# Patient Record
Sex: MALE | Race: WHITE
[De-identification: names, ages, dates, MRNs, and addresses within clinical notes are randomized per-mention and may not be internally consistent; named-entity substitution may affect disease eponyms.]

---

## 2017-01-09 ENCOUNTER — HOSPITAL ENCOUNTER (INPATIENT)
Dept: HOSPITAL 45 - C.EDB | Age: 82
LOS: 3 days | Discharge: OTHER INSTITUTION | DRG: 247 | End: 2017-01-12
Attending: INTERNAL MEDICINE | Admitting: HOSPITALIST
Payer: COMMERCIAL

## 2017-01-09 VITALS
DIASTOLIC BLOOD PRESSURE: 90 MMHG | HEART RATE: 62 BPM | TEMPERATURE: 97.88 F | OXYGEN SATURATION: 95 % | SYSTOLIC BLOOD PRESSURE: 160 MMHG

## 2017-01-09 VITALS
DIASTOLIC BLOOD PRESSURE: 88 MMHG | TEMPERATURE: 97.7 F | OXYGEN SATURATION: 94 % | HEART RATE: 65 BPM | SYSTOLIC BLOOD PRESSURE: 164 MMHG

## 2017-01-09 VITALS
SYSTOLIC BLOOD PRESSURE: 164 MMHG | HEART RATE: 76 BPM | DIASTOLIC BLOOD PRESSURE: 85 MMHG | TEMPERATURE: 97.16 F | OXYGEN SATURATION: 96 %

## 2017-01-09 VITALS
OXYGEN SATURATION: 95 % | DIASTOLIC BLOOD PRESSURE: 83 MMHG | SYSTOLIC BLOOD PRESSURE: 150 MMHG | HEART RATE: 64 BPM | TEMPERATURE: 97.34 F

## 2017-01-09 VITALS
HEIGHT: 71 IN | HEIGHT: 71 IN | WEIGHT: 221.34 LBS | BODY MASS INDEX: 30.99 KG/M2 | BODY MASS INDEX: 30.99 KG/M2 | WEIGHT: 221.34 LBS

## 2017-01-09 VITALS
SYSTOLIC BLOOD PRESSURE: 164 MMHG | OXYGEN SATURATION: 94 % | DIASTOLIC BLOOD PRESSURE: 85 MMHG | TEMPERATURE: 97.16 F | HEART RATE: 76 BPM

## 2017-01-09 DIAGNOSIS — Z79.82: ICD-10-CM

## 2017-01-09 DIAGNOSIS — Z90.49: ICD-10-CM

## 2017-01-09 DIAGNOSIS — Z79.899: ICD-10-CM

## 2017-01-09 DIAGNOSIS — Z87.891: ICD-10-CM

## 2017-01-09 DIAGNOSIS — Z96.1: ICD-10-CM

## 2017-01-09 DIAGNOSIS — Z86.73: ICD-10-CM

## 2017-01-09 DIAGNOSIS — Z66: ICD-10-CM

## 2017-01-09 DIAGNOSIS — N18.3: ICD-10-CM

## 2017-01-09 DIAGNOSIS — N28.9: ICD-10-CM

## 2017-01-09 DIAGNOSIS — I25.110: Primary | ICD-10-CM

## 2017-01-09 DIAGNOSIS — E78.5: ICD-10-CM

## 2017-01-09 DIAGNOSIS — Z88.8: ICD-10-CM

## 2017-01-09 DIAGNOSIS — Z98.41: ICD-10-CM

## 2017-01-09 DIAGNOSIS — Z95.5: ICD-10-CM

## 2017-01-09 DIAGNOSIS — Z80.9: ICD-10-CM

## 2017-01-09 DIAGNOSIS — I12.9: ICD-10-CM

## 2017-01-09 LAB
ALP SERPL-CCNC: 67 U/L (ref 45–117)
ALT SERPL-CCNC: 33 U/L (ref 12–78)
ANION GAP SERPL CALC-SCNC: 13 MMOL/L (ref 3–11)
AST SERPL-CCNC: 31 U/L (ref 15–37)
BASOPHILS # BLD: 0.04 K/UL (ref 0–0.2)
BASOPHILS NFR BLD: 0.5 %
BUN SERPL-MCNC: 26 MG/DL (ref 7–18)
BUN/CREAT SERPL: 13 (ref 10–20)
CALCIUM SERPL-MCNC: 9.4 MG/DL (ref 8.5–10.1)
CHLORIDE SERPL-SCNC: 106 MMOL/L (ref 98–107)
CKMB/CK RATIO: 2 (ref 0–3)
CO2 SERPL-SCNC: 24 MMOL/L (ref 21–32)
COMPLETE: YES
CREAT CL PREDICTED SERPL C-G-VRATE: 32.3 ML/MIN
CREAT SERPL-MCNC: 2 MG/DL (ref 0.6–1.4)
EOSINOPHIL NFR BLD AUTO: 169 K/UL (ref 130–400)
GLUCOSE SERPL-MCNC: 113 MG/DL (ref 70–99)
HCT VFR BLD CALC: 52.1 % (ref 42–52)
IG%: 0.3 %
IMM GRANULOCYTES NFR BLD AUTO: 27 %
INR PPP: 1.1 (ref 0.9–1.1)
LYMPHOCYTES # BLD: 1.97 K/UL (ref 1.2–3.4)
MCH RBC QN AUTO: 33.2 PG (ref 25–34)
MCHC RBC AUTO-ENTMCNC: 36.3 G/DL (ref 32–36)
MCV RBC AUTO: 91.4 FL (ref 80–100)
MONOCYTES NFR BLD: 10.4 %
NEUTROPHILS # BLD AUTO: 2.6 %
NEUTROPHILS NFR BLD AUTO: 59.2 %
PMV BLD AUTO: 10 FL (ref 7.4–10.4)
POTASSIUM SERPL-SCNC: 3.5 MMOL/L (ref 3.5–5.1)
PROTHROMBIN TIME: 11.8 SECONDS (ref 9–12)
RBC # BLD AUTO: 5.7 M/UL (ref 4.7–6.1)
SODIUM SERPL-SCNC: 143 MMOL/L (ref 136–145)
WBC # BLD AUTO: 7.3 K/UL (ref 4.8–10.8)

## 2017-01-09 RX ADMIN — METOPROLOL TARTRATE SCH MG: 25 TABLET, FILM COATED ORAL at 21:14

## 2017-01-09 RX ADMIN — HEPARIN SODIUM SCH UNIT: 10000 INJECTION, SOLUTION INTRAVENOUS; SUBCUTANEOUS at 21:15

## 2017-01-09 RX ADMIN — ALUMINUM ZIRCONIUM TRICHLOROHYDREX GLY SCH EA: 0.2 STICK TOPICAL at 16:00

## 2017-01-09 NOTE — DIAGNOSTIC IMAGING REPORT
CHEST ONE VIEW PORTABLE



CLINICAL HISTORY: Chest pain.    



COMPARISON STUDY:  Chest radiograph April 15, 2015.



FINDINGS: Lung volumes are normal. There is no pneumothorax or pleural effusion.

Tortuosity of the descending thoracic aorta is unchanged. Mild cardiomegaly is

unchanged. There is no evidence of pulmonary edema. 



IMPRESSION:  No acute findings. No change in appearance of the chest. 









Electronically signed by:  Brad Magdaleno M.D.

1/9/2017 8:26 AM



Dictated Date/Time:  1/9/2017 8:24 AM

## 2017-01-09 NOTE — HISTORY AND PHYSICAL
History & Physical


Date & Time of Service:


Jan 9, 2017 at 09:48


Chief Complaint:


Chest Pain


Primary Care Physician:


Driss Espana D.O.


History of Present Illness


Source:  patient


This is an 87 y/o male with PMHx of CKD stage 3, CAD s/p stent placement, HTN, 

Dyslipidemia and other problems as outlined below who presents to the ED c/o 

intermittent chest pain x 3 days. Pt reports that 3 days ago he had some 

indigestion after eating pizza and developed 6/10 central "burning" chest pain 

that radiated to his esophagus and bilat shoulders. He tried taking a Prilosec 

which seemed to help however over the next few days his sxs would return when 

he was exerting himself. He states he always has some SOB but it also seemed to 

be worse than usual over the last few days. Pt has a history of tobacco use and 

CAD with stent placed a few years ago. He feels that this pain is similar to 

the sxs he felt before his stent was placed. Pt denies history of reflux. He 

currently resides at [facility]. He did not take his 

medicine this morning. Pt denies fever/chills, diaphoresis, palpitations, 

wheezing, abd pain, N/V, bowel or bladder issues, LE edema ,calf pain, 

lightheadedness/dizziness. In the ED, vitals are stable. Initial trop is 

negative and EKG showing new T wave inversions in the inferior leads and 

nonspec ST changes in the lateral leads. Pt received nitro which alleviated 

some of his pain. He is currently c/o 3/10 chest pain. Pt will be admitted for 

further evaluation and treatment.





Past Medical/Surgical History


Medical Problems:


(1) CAD (coronary artery disease)


Status: Chronic  





(2) CKD (chronic kidney disease) stage 3, GFR 30-59 ml/min


Status: Chronic  





(3) CVA (cerebral vascular accident)


Status: Resolved  





(4) Dyslipidemia


Status: Chronic  





(5) HTN (hypertension)


Status: Chronic  





Surgical Problems:


(1) H/O cataract removal with insertion of prosthetic lens


Permanent Comment: R eye 


Status: Resolved  





(2) H/O umbilical hernia repair


Status: Resolved  





(3) Hx of appendectomy


Status: Resolved  





(4) Hx of cholecystectomy


Status: Resolved  











Family History





Cancer





Social History


Smoking Status:  Former Smoker (15 pack year history; quit 1965)


Alcohol Use:  none


Drug Use:  none


Marital Status:  single


Housing status:  lives alone ([facility])


Occupational Status:  retired





Immunizations


History of Influenza Vaccine:  Yes


Influenza Vaccine Date:  Sep 12, 2013


History of Tetanus Vaccine?:  Yes


History of Pneumococcal:  Yes


Pneumococcal Date:  Dec 12, 2010


History of Hepatitis B Vaccine:  No





Multi-Drug Resistant Organisms


History of MDRO:  No





Allergies


Coded Allergies:  


     Loratadine (Verified  Adverse Reaction, Intermediate, GI SYMPTOMS, 1/9/17)


     Simvastatin (Verified  Adverse Reaction, Mild, MUSCLE /JOINT PAINT PAIN, 1/ 9/17)





Home Medications


Scheduled


Acetaminophen (Tylenol), 500 MG PO PRN


Amlodipine (Norvasc), 5 MG PO DAILY


Aspirin (Aspirin Ec), 162 MG PO DAILY


Fenofibrate (Tricor), 67 MG PO DAILY


Hydrochlorothiazide (Hctz *), 25 MG PO DAILY


Losartan Potassium (Cozaar *), 50 MG PO DAILY


Metoprolol Tartrate (Lopressor), 25 MG PO BID


Pot Phosphate Monobasic W/ Sod (Phospha 250 Neutral), 250 MG PO DAILY





Review of Systems


Constitutional:  No chills, No fatigue, No fever, No sweats, No weakness


Eyes:  No worsening of vision


ENT:  No hearing loss


Respiratory:  + dyspnea on exertion, + shortness of breath, No cough, No 

dyspnea at rest


Cardiovascular:  + chest pain, No claudication, No edema


Abdomen:  No constipation, No diarrhea, No nausea, No pain, No vomiting


Musculoskeletal:  No calf pain, No swelling


Genitourinary - Male:  No dysuria


Neurologic:  No weakness


Psychiatric:  No depression symptoms


Endocrine:  No fatigue


Hematologic / Lymphatic:  No abnormal bleeding/bruising


Integumentary:  No new/changing skin lesions





Physical Exam


Vital Signs











  Date Time  Temp Pulse Resp B/P Pulse Ox O2 Delivery O2 Flow Rate FiO2


 


1/9/17 08:32  64 16 137/78 94 Room Air  


 


1/9/17 07:57  76      


 


1/9/17 07:52     96 Room Air  


 


1/9/17 07:52     96 Room Air  


 


1/9/17 07:48     96 Room Air  


 


1/9/17 07:48 36.6 78 18 162/80 96 Room Air  








General Appearance:  WD/WN, no apparent distress, + pertinent finding (Pt is 

sitting up in bed in NAD)


Head:  normocephalic, atraumatic


Eyes:  normal inspection


ENT:  hearing grossly normal


Neck:  supple


Respiratory/Chest:  chest non-tender, lungs clear, normal breath sounds, no 

respiratory distress


Cardiovascular:  regular rate, rhythm, no murmur


Abdomen/GI:  normal bowel sounds, non tender, soft


Back:  normal inspection


Extremities/Musculoskelatal:  normal inspection, no calf tenderness, + swelling 

(trace bilat LE edema)


Neurologic/Psych:  alert, normal mood/affect, oriented x 3


Skin:  normal color, warm/dry





Diagnostics


Laboratory Results





Results Past 24 Hours








Test


  1/9/17


08:05 Range/Units


 


 


White Blood Count 7.30 4.8-10.8  K/uL


 


Red Blood Count 5.70 4.7-6.1  M/uL


 


Hemoglobin 18.9 14.0-18.0  g/dL


 


Hematocrit 52.1 42-52  %


 


Mean Corpuscular Volume 91.4   fL


 


Mean Corpuscular Hemoglobin 33.2 25-34  pg


 


Mean Corpuscular Hemoglobin


Concent 36.3


  32-36  g/dl


 


 


Platelet Count 169 130-400  K/uL


 


Mean Platelet Volume 10.0 7.4-10.4  fL


 


Neutrophils (%) (Auto) 59.2  %


 


Lymphocytes (%) (Auto) 27.0  %


 


Monocytes (%) (Auto) 10.4  %


 


Eosinophils (%) (Auto) 2.6  %


 


Basophils (%) (Auto) 0.5  %


 


Neutrophils # (Auto) 4.32 1.4-6.5  K/uL


 


Lymphocytes # (Auto) 1.97 1.2-3.4  K/uL


 


Monocytes # (Auto) 0.76 0.11-0.59  K/uL


 


Eosinophils # (Auto) 0.19 0-0.5  K/uL


 


Basophils # (Auto) 0.04 0-0.2  K/uL


 


RDW Standard Deviation 46.8 36.4-46.3  fL


 


RDW Coefficient of Variation 14.0 11.5-14.5  %


 


Immature Granulocyte % (Auto) 0.3  %


 


Immature Granulocyte # (Auto) 0.02 0.00-0.02  K/uL


 


Sodium Level 143 136-145  mmol/L


 


Potassium Level 3.5 3.5-5.1  mmol/L


 


Chloride Level 106   mmol/L


 


Carbon Dioxide Level 24 21-32  mmol/L


 


Anion Gap 13.0 3-11  mmol/L


 


Blood Urea Nitrogen 26 7-18  mg/dl


 


Creatinine


  2.00


  0.60-1.40


mg/dl


 


Est Creatinine Clear Calc


Drug Dose 32.3


   ml/min


 


 


Estimated GFR (


American) 34.0


   


 


 


Estimated GFR (Non-


American 29.3


   


 


 


BUN/Creatinine Ratio 13.0 10-20  


 


Random Glucose 113 70-99  mg/dl


 


Calcium Level 9.4 8.5-10.1  mg/dl


 


Total Bilirubin 1.1 0.2-1  mg/dl


 


Direct Bilirubin 0.3 0-0.2  mg/dl


 


Aspartate Amino Transf


(AST/SGOT) 31


  15-37  U/L


 


 


Alanine Aminotransferase


(ALT/SGPT) 33


  12-78  U/L


 


 


Alkaline Phosphatase 67   U/L


 


Total Creatine Kinase 133   U/L


 


Creatine Kinase MB 2.7 0.5-3.6  ng/ml


 


Creatine Kinase MB Ratio 2.0 0-3.0  


 


Troponin I < 0.015 0-0.045  ng/ml


 


Total Protein 7.2 6.4-8.2  gm/dl


 


Albumin 3.7 3.4-5.0  gm/dl


 


Lipase 188   U/L











Diagnostic Radiology


CXR IMPRESSION:  No acute findings. No change in appearance of the chest.





EKG


EKG: NSR at 72 bpm with left axis deviation, T wave inversion in inferior leads 

and nonspec St change sin lateral leads; T wave and ST changes are new findings 

when compared to EKG from 4/15/15





Impression


Assessment and Plan


CHEST PAIN R/O ACS 


pt presents with chest pain assoc with exertional SOB and indigestion; h/o CAD s

/p stent placement  


-admit observation status to telemetry 


-? ischemia vs. reflux sxs


-RFs include personal h/o CAD s/p stent, HTN, Dyslipidemia, sex and age 


-EKG shows new T wave inversions in inferior leads and nonspec ST changes in 

lateral leads; repeat EKG PRN chest pain and in AM


-Initial troponin is negative; continue to monitor with serial cardiac enzymes 

q6h


-obtain echo to r/o cardiac wall motion abnormalities 


-cont ASA, BB and fenofibrate 


-start trial PPI 


-consult cardiology, Dr. Meracdo-pending input  


-pt currently c/o 3/10 chest pain 


-continue to monitor 





CKD STAGE 3 


-creatinine is currently around baseline at 2.0 


-cont to monitor with daily prp and avoid nephrotoxic agents when able 





H/O CVA 


-cont ASA and fenofibrate 


-no focal neuro deficits on exam 





HTN 


-BP elevated; pt did not take medicine this morning 


-cont losartan, metoprolol, HCTZ, Norvasc 


-monitor 





DYSLIPIDEMIA 


-documented intolerance to statin 


-cont fenofibrate 





DVT PROPHYLAXIS


-subq heparin 





CODE STATUS


-DNR per my discussion with patient upon admission 





DISPO


Observation status until further workup is complete. Pt seen in collaboration 

with Dr. Elias. Please see his addendum for further details. Thanks!





Attending Note:


    


Patient is a 86 Yr old male with PMH of CAD s/p stent placement,  CKD III, HTN, 

Dyslipidemia and other problems presented with intermittent dull to burning 

sensation, retrosternal, radiating to bilateral upper extremities and throat 

since 3 days duration.States having indigestion 3 days ago which relieved 

temporarily with Prilosec.Reports associated exertional SOB, no aggravating/

relieving factors.Denies any fever, chills, diaphoresis, palpitations, cough, 

wheezing, abd pain, nausea, vomiting, dizziness. Also denies orthopnea, PND, 

leg swelling. Initial trop is negative and EKG showing new T wave inversions in 

the inferolateral leads. States getting NTG en route to hospital which relieved 

his chest pain. 





Physical Exam:


      Vital signs as noted above


      General: Well built and nourished, no distress


      HEENT: NC/AT, EOMI, PERRLA


      Neck: Supple, No JVD


      CVS; S1, S2, No murmur


      Resp: CTA, Normal breath sounds, No accessory muscle use


      Abd: Soft, Protuberant, non tender, BS present


     CNS: No focal deficits


      Ext: No pedal edema, cyanosis, clubbing








Assessment and plan:





Atypical chest pain R/O ACS


          H/O CAD S/P stents


          Negative troponin X 2, EKG:showed T wave inversions in inferior lead


          Will check old EKG to compare


          Continue monitoring in Tele


          ECHO: no wall motion abnormality


          Trend troponins


          May need stress ECHO


          Cardiology consulted


          Continue ASA, BB and fenofibrate


          H/O intolerance to statins  


          Will do trial of PPI 


       


CKD III


         Cr levels; 2.0


         Monitor renal function       





H/O CVA and Left carotid endarterectomy


         Stable


         Continue home meds





Agree with assessment and plan as above





Advanced Directives


Existing Advance Directive:  Yes


Existing Living Will:  Yes


Existing Power of :  Yes





VTE Prophylaxis


VTE Risk Assessment Done? Y/N:  Yes


Risk Level:  Moderate

## 2017-01-09 NOTE — CARDIOLOGY CONSULTATION
DATE OF CONSULTATION:  01/09/2017

 

REFERRING PHYSICIAN:  [facility].

 

REASON FOR CONSULTATION:  Chest discomfort.

 

HISTORY OF PRESENT ILLNESS:  This patient is an 86-year-old male who follows

with Dr. Yaya Slade at the [facility].  In April 2015, he

presented with a positive stress echocardiogram and underwent cardiac

catheterization.  He was found to have a severe stenosis of the right

coronary artery and received a drug-eluting stent.  His heart disease has

been stable.  The patient states that starting approximately 3 days ago he

had some indigestion and then he has noticed some upper chest discomfort and

burning intermittently with activity.  He decided to present to the Emergency

Department.  He has been given sublingual nitroglycerin as well as a GI

cocktail and nitro paste and is currently pain free.  His first set of

cardiac markers is negative.  His EKG reveals nonspecific changes.

 

ALLERGIES:  LORATADINE AND SIMVASTATIN.

 

PAST MEDICAL HISTORY:  As outlined above.  The patient had a drug-eluting

stent placed in the right coronary artery in April 2015.  He does have

dyslipidemia; however, he is intolerant of statin medications.  History of

hypertension.  He has chronic stage III kidney disease.  He has had a

previous left carotid endarterectomy following a CVA.  Although he denies

having history of GERD, it is listed as one of his complaints as an

outpatient.

 

SOCIAL HISTORY:  He is a reformed cigarette smoker.

 

FAMILY MEDICAL HISTORY:  Noncontributory.

 

REVIEW OF SYSTEMS:  A 10-point review of systems is negative except for the

history of chief complaint.

 

PHYSICAL EXAMINATION:

GENERAL:  He is alert and oriented, in no acute distress.

VITAL SIGNS:  Blood pressure is 140/80, pulse is regular at 73.  He is

afebrile.

HEENT:  Normocephalic.  Pupils are equal and reactive to light.  Extraocular

muscles are intact bilaterally.

NECK:  The neck veins are flat.  Carotids have good upstrokes bilaterally. 

Thyroid is nonpalpable.

RESPIRATORY:  Breath sounds equal bilaterally and clear to auscultation.

CARDIOVASCULAR:  Heart has regular rhythm.  Normal S1, S2.  No S3, S4.  No

cardiac rubs or murmurs.

GASTROINTESTINAL:  Abdomen is soft, nontender without organomegaly.

EXTREMITIES:  Free of edema, digit clubbing, or cyanosis.

NEUROLOGIC:  Grossly intact.

SKIN:  Warm to touch.

LYMPH NODES:  Negative to palpation.

 

LABORATORY DATA:  Per the history of chief complaint.  In addition, the first

hemoglobin is 18.9, which is high.  Her creatinine is 2.0, BUN is 26.  First

set of cardiac markers are negative.

 

IMPRESSION:

1.  Chest and throat discomfort.

2.  Known coronary artery disease with prior coronary stent placed in the

right coronary artery, April 2015.

3.  History of previous stroke and left carotid endarterectomy.

4.  Intolerance to statin medications.

 

RECOMMENDATIONS:  The patient will be admitted to observation.  He will have

additional cardiac markers drawn; however, despite having chest pain for 3

days, his first set of cardiac markers is negative and his EKG shows no acute

changes.  His echocardiogram performed in the Emergency Department shows no

wall motion abnormalities.  We will wait for a second set of markers before

making further decisions.

## 2017-01-09 NOTE — ECHOCARDIOGRAM REPORT
*NOTICE TO RECEIVING PARTY AGENCY**  This information is strictly Confidential and protected under 
[place] law.  [place] law prohibits you from making any further disclosure of this 
information unless further disclosure is expressly permitted by the written consent of the person to 
whom it pertains or is authorized by law.  A general authorization for the release of medical or 
other information is not sufficient for this purpose.  Hospital accepts no responsibility if the 
information is made available to any other person, INCLUDING THE PATIENT.



Interpretation Summary

  *  Name: KOBI NESBITT  Study Date: 2017 11:22 AM  BP: 148/88 mmHg

  *  MRN: [id number]  Patient Location: C.EDB  HR: 72

  *  : 1930 ([birth year]yyyy)  Gender: Male  Height: 71 in

  *  Age: 86 yrs  Ethnicity: [place]  Weight: 225 lb

  *  Ordering Physician: Naty Elias

  *  Referring Physician: Self, Referred

  *  Performed By: Odessa Pozo RCS

  *  Accession# [id number]  Account# [id number]

  *  Reason For Study: CHEST PAIN

  *  BSA: 2.2 m2

  *  -- Conclusions --

  *  The left ventricle is normal in size.

  *  There is mild concentric left ventricular hypertrophy.

  *  The left ventricular wall motion is normal.

  *  Ejection Fraction = 55-60%.

  *  Grade I diastolic dysfunction, (abnormal relaxation pattern).

Procedure Details

  *  A complete two-dimensional transthoracic echocardiogram was performed (2D, M-mode, Doppler and 
color flow Doppler).

  *  The study was technically difficult.

  *  A contrast injection of Definity was performed to improve assessment of LV function.

  *  Contrast was injected into an intravenous site in the left arm.

  *  One vial of Definity ultrasound contrast was diluted in normal saline to a total volume of 10 
ml.  A total of '2' ml of solution was administered during imaging.

  *  Lot # [id number] of Definity utilized for procedure.

  *  Expiration date 1 .

  *  The attending nurse who injected the contrast agent was ANTONIA DURAN ED, RN.

Left Ventricle

  *  The left ventricle is normal in size.

  *  There is mild concentric left ventricular hypertrophy.

  *  Ejection Fraction = 55-60%.

  *  The left ventricular wall motion is normal.

Right Ventricle

  *  The right ventricle is normal size.

  *  The right ventricular systolic function is normal.

Atria

  *  The left atrial size is normal.

  *  Right atrial size is normal.

  *  The interatrial septum is intact with no evidence for an atrial septal defect.

Mitral Valve

  *  The mitral valve is grossly normal.

  *  Significant mitral regurgitation is absent.

Tricuspid Valve

  *  The tricuspid valve anatomy is normal.

  *  Significant tricuspid regurgitation is absent.

Aortic Valve

  *  The aortic valve is tricuspid.  The leaflet thickness if normal. There is no aortic stenosis, 
and no significant insufficiency.

  *  Aortic stenosis is absent.

  *  There is no significant aortic regurgitation.

Pulmonic Valve

  *  The pulmonic valve is not well visualized.

  *  There is no significant pulmonary regurgitation.

Great Vessels

  *  The aortic root and proximal ascending aorta are normal sized.

Pericardium/Pleural

  *  There is no pericardial effusion.

Left Ventricular Diastolic Function

  *  Grade I diastolic dysfunction, (abnormal relaxation pattern).



MMode 2D Measurements and Calculations

IVSd 1.9 cm

IVSs 1.8 cm



LVIDd 4.5 cm

LVIDs 3.4 cm

LVPWd 1.3 cm

LVPWs 1.7 cm



IVS/LVPW 1.5 

FS 23.6 %

EDV(Teich) 90.9 ml

ESV(Teich) 47.8 ml

EF(Teich) 47.4 %



EDV(cubed) 89.2 ml

ESV(cubed) 39.7 ml

EF(cubed) 55.5 %

% IVS thick -3.12 %

% LVPW thick 32.6 %





LV mass(C)d 296.6 grams

LV mass(C)dI 133.8 grams/m\S\2

LV mass(C)s 244.9 grams

LV mass(C)sI 110.4 grams/m\S\2



SV(Teich) 43.1 ml

SI(Teich) 19.4 ml/m\S\2

SV(cubed) 49.5 ml

SI(cubed) 22.3 ml/m\S\2



Ao root diam 4.1 cm

Ao root area 13.0 cm\S\2

ACS 2.5 cm

LA dimension 3.9 cm



LA/Ao 0.95 

LVOT diam 2.0 cm

LVOT area 3.2 cm\S\2





LVAd ap4 27.6 cm\S\2

LVLd ap4 8.1 cm

EDV(MOD-sp4) 78.4 ml

EDV(sp4-el) 80.3 ml

LVAs ap4 13.8 cm\S\2

LVLs ap4 6.0 cm

ESV(MOD-sp4) 26.2 ml

ESV(sp4-el) 27.1 ml

EF(MOD-sp4) 66.5 %

EF(sp4-el) 66.2 %



LVAd ap2 24.8 cm\S\2

LVLd ap2 7.8 cm

EDV(MOD-sp2) 64.6 ml

EDV(sp2-el) 67.1 ml

LVAs ap2 15.1 cm\S\2

LVLs ap2 6.2 cm

ESV(MOD-sp2) 29.4 ml

ESV(sp2-el) 31.0 ml

EF(MOD-sp2) 54.4 %

EF(sp2-el) 53.7 %



LVLd %diff -3.71 %

EDV(MOD-bp) 70.6 ml

LVLs %diff 3.7 %

ESV(MOD-bp) 28.5 ml

EF(MOD-bp) 59.6 %



SV(MOD-sp4) 52.2 ml

SI(MOD-sp4) 23.5 ml/m\S\2





SV(MOD-sp2) 35.1 ml

SI(MOD-sp2) 15.8 ml/m\S\2



SV(MOD-bp) 42.1 ml

SI(MOD-bp) 19.0 ml/m\S\2



SV(sp4-el) 53.2 ml

SI(sp4-el) 24.0 ml/m\S\2



SV(sp2-el) 36.0 ml

SI(sp2-el) 16.3 ml/m\S\2







Doppler Measurements and Calculations

MV E max luisa 61.5 cm/sec

MV A max luisa 94.5 cm/sec



MV E/A 0.65 



MV P1/2t max luisa 66.4 cm/sec

MV P1/2t 54.2 msec

MVA(P1/2t) 4.1 cm\S\2

MV dec slope 358.8 cm/sec\S\2

MV dec time 0.33 sec



Ao V2 max 98.9 cm/sec

Ao max PG 3.9 mmHg

Ao max PG (full) 0.12 mmHg

SOPHIA(V,A) 3.2 cm\S\2

SOPHIA(V,D) 3.2 cm\S\2





LV V1 max PG 3.8 mmHg



LV V1 max 97.3 cm/sec



PA V2 max 67.0 cm/sec

PA max PG 1.8 mmHg

## 2017-01-09 NOTE — EMERGENCY ROOM VISIT NOTE
History


Report prepared by oJnas:  Naila Wyman


Under the Supervision of:  Dr. Isaac Eller M.D.


First contact with patient:  07:53


Chief Complaint:  CHEST PAIN


Stated Complaint:  CHEST PAIN





History of Present Illness


The patient is an 86 year old male who presents to the Emergency Room with 

complaints of persistent chest pain that began three days ago.  He currently 

describes his pain as a dull pain.  The patient states that several years ago 

he had a stent placed by Dr. Melara, Cardiology.  He states that at that time he 

experienced dull chest pain and states that his pain today feels similar to 

that event.  The patient states that he was given nitro prior to arrival by EMS 

and states that his pain has been alleviated.  He additionally notes 

indigestion today, but denies any abdominal pain.  The patient states that he 

takes aspirin daily, but denies any other anti-coagulants.  He states that he 

is a previous smoker several years ago, but denies any history of diabetes.





   Source of History:  patient


   Onset:  three days ago


   Position:  chest


   Quality:  dull


   Timing:  other (persistent)


   Modifying Factors (Relieving):  other (nitro)


   Associated Symptoms:  No abdominal pain


Note:


Associated Symptoms: indigestion.





Review of Systems


See HPI for pertinent positives & negatives. A total of 10 systems reviewed and 

were otherwise negative.





Past Medical & Surgical


Medical Problems:


(1) CAD (coronary artery disease)


(2) CKD (chronic kidney disease) stage 3, GFR 30-59 ml/min


(3) CVA (cerebral vascular accident)


(4) Dyslipidemia


(5) HTN (hypertension)


Surgical Problems:


(1) H/O cataract removal with insertion of prosthetic lens


(2) H/O umbilical hernia repair


(3) Hx of appendectomy


(4) Hx of cholecystectomy








Family History





Cancer





Social History


Smoking Status:  Former Smoker


Alcohol Use:  none


Drug Use:  none


Marital Status:  single


Occupation Status:  retired





Current/Historical Medications


Scheduled


Acetaminophen (Tylenol), 500 MG PO PRN


Amlodipine (Norvasc), 5 MG PO DAILY


Aspirin (Aspirin Ec), 162 MG PO DAILY


Fenofibrate (Tricor), 67 MG PO DAILY


Hydrochlorothiazide (Hctz *), 25 MG PO DAILY


Losartan Potassium (Cozaar *), 50 MG PO DAILY


Metoprolol Tartrate (Lopressor), 25 MG PO BID


Pot Phosphate Monobasic W/ Sod (Phospha 250 Neutral), 250 MG PO DAILY





Allergies


Coded Allergies:  


     Loratadine (Verified  Adverse Reaction, Intermediate, GI SYMPTOMS, 1/9/17)


     Simvastatin (Verified  Adverse Reaction, Mild, MUSCLE /JOINT PAINT PAIN, 1/ 9/17)





Physical Exam


Vital Signs











  Date Time  Temp Pulse Resp B/P Pulse Ox O2 Delivery O2 Flow Rate FiO2


 


1/9/17 10:02  62      


 


1/9/17 09:30  79 18 175/98    


 


1/9/17 09:12     94 Room Air  


 


1/9/17 08:32  64 16 137/78 94 Room Air  


 


1/9/17 07:57  76      


 


1/9/17 07:52     96 Room Air  


 


1/9/17 07:52     96 Room Air  


 


1/9/17 07:48     96 Room Air  


 


1/9/17 07:48 36.6 78 18 162/80 96 Room Air  











Physical Exam


GENERAL: Patient is a healthy-appearing well-nourished


HEAD: Normocephalic atraumatic


EYES: Ocular movements intact pupils equal and react to light


OROPHARYNX mucous membranes are moist no exudates present no erythema or edema 

present


NECK: Supple no nuchal rigidity


CHEST: Good equal expansion


LUNGS: Clear and equal to auscultation


CARDIAC: Normal S1 and S2


ABDOMEN: Soft nontender no guarding


BACK: No CVA tenderness


EXTREMITIES: No pain upon palpation normal muscle strength in all groups no 

clubbing cyanosis or edema


NEURO: Patient is following commands is answering questions appropriately. 

Alert and oriented x3 Cranial Nerves 2-12 grossly intact





Medical Decision & Procedures


ER Provider


Diagnostic Interpretation:


X-ray results as stated below per interpretation by me and the radiologist: 








CHEST ONE VIEW PORTABLE





CLINICAL HISTORY: Chest pain.    





COMPARISON STUDY:  Chest radiograph April 15, 2015.





FINDINGS: Lung volumes are normal. There is no pneumothorax or pleural effusion.


Tortuosity of the descending thoracic aorta is unchanged. Mild cardiomegaly is


unchanged. There is no evidence of pulmonary edema. 





IMPRESSION:  No acute findings. No change in appearance of the chest. 





Electronically signed by:  Brad Magdaleno M.D.


1/9/2017 8:26 AM





Dictated Date/Time:  1/9/2017 8:24 AM





Laboratory Results


1/9/17 08:05








Red Blood Count 5.70, Mean Corpuscular Volume 91.4, Mean Corpuscular Hemoglobin 

33.2, Mean Corpuscular Hemoglobin Concent 36.3, Mean Platelet Volume 10.0, 

Neutrophils (%) (Auto) 59.2, Lymphocytes (%) (Auto) 27.0, Monocytes (%) (Auto) 

10.4, Eosinophils (%) (Auto) 2.6, Basophils (%) (Auto) 0.5, Neutrophils # (Auto

) 4.32, Lymphocytes # (Auto) 1.97, Monocytes # (Auto) 0.76, Eosinophils # (Auto

) 0.19, Basophils # (Auto) 0.04





1/9/17 08:05

















Test


  1/9/17


08:05


 


White Blood Count


  7.30 K/uL


(4.8-10.8)


 


Red Blood Count


  5.70 M/uL


(4.7-6.1)


 


Hemoglobin


  18.9 g/dL


(14.0-18.0)


 


Hematocrit 52.1 % (42-52) 


 


Mean Corpuscular Volume


  91.4 fL


()


 


Mean Corpuscular Hemoglobin


  33.2 pg


(25-34)


 


Mean Corpuscular Hemoglobin


Concent 36.3 g/dl


(32-36)


 


Platelet Count


  169 K/uL


(130-400)


 


Mean Platelet Volume


  10.0 fL


(7.4-10.4)


 


Neutrophils (%) (Auto) 59.2 % 


 


Lymphocytes (%) (Auto) 27.0 % 


 


Monocytes (%) (Auto) 10.4 % 


 


Eosinophils (%) (Auto) 2.6 % 


 


Basophils (%) (Auto) 0.5 % 


 


Neutrophils # (Auto)


  4.32 K/uL


(1.4-6.5)


 


Lymphocytes # (Auto)


  1.97 K/uL


(1.2-3.4)


 


Monocytes # (Auto)


  0.76 K/uL


(0.11-0.59)


 


Eosinophils # (Auto)


  0.19 K/uL


(0-0.5)


 


Basophils # (Auto)


  0.04 K/uL


(0-0.2)


 


RDW Standard Deviation


  46.8 fL


(36.4-46.3)


 


RDW Coefficient of Variation


  14.0 %


(11.5-14.5)


 


Immature Granulocyte % (Auto) 0.3 % 


 


Immature Granulocyte # (Auto)


  0.02 K/uL


(0.00-0.02)


 


Anion Gap


  13.0 mmol/L


(3-11)


 


Est Creatinine Clear Calc


Drug Dose 32.3 ml/min 


 


 


Estimated GFR (


American) 34.0 


 


 


Estimated GFR (Non-


American 29.3 


 


 


BUN/Creatinine Ratio 13.0 (10-20) 


 


Calcium Level


  9.4 mg/dl


(8.5-10.1)


 


Total Bilirubin


  1.1 mg/dl


(0.2-1)


 


Direct Bilirubin


  0.3 mg/dl


(0-0.2)


 


Aspartate Amino Transf


(AST/SGOT) 31 U/L (15-37) 


 


 


Alanine Aminotransferase


(ALT/SGPT) 33 U/L (12-78) 


 


 


Alkaline Phosphatase


  67 U/L


()


 


Total Creatine Kinase


  133 U/L


()


 


Creatine Kinase MB


  2.7 ng/ml


(0.5-3.6)


 


Creatine Kinase MB Ratio 2.0 (0-3.0) 


 


Troponin I


  < 0.015 ng/ml


(0-0.045)


 


Total Protein


  7.2 gm/dl


(6.4-8.2)


 


Albumin


  3.7 gm/dl


(3.4-5.0)


 


Lipase


  188 U/L


()








Labs reviewed by ED physician.





Medications Administered











 Medications


  (Trade)  Dose


 Ordered  Sig/Kenny


 Route  Start Time


 Stop Time Status Last Admin


Dose Admin


 


 Nitroglycerin 1


 inch  1 inch  NOW  ONCE


 EXT  1/9/17 08:30


 1/9/17 08:31 DC 1/9/17 08:29


1 INCH


 


 Sodium Chloride


  (Nss 500ml)  500 ml @ 


 999 mls/hr  Q31M STAT


 IV  1/9/17 08:47


 1/9/17 09:17 DC 1/9/17 08:54


999 MLS/HR


 


 Famotidine


  (Pepcid Tab)  20 mg  NOW  ONCE


 PO  1/9/17 09:15


 1/9/17 09:16 DC 1/9/17 09:26


20 MG


 


 Sucralfate


  (Carafate Tab)  1 gm  NOW  STAT


 PO  1/9/17 09:04


 1/9/17 09:06 DC 1/9/17 09:26


1 GM


 


 Al Hydroxide/Mg


 Hydroxide


  (Maalox Susp)  30 ml  STK-MED ONCE


 .ROUTE  1/9/17 09:23


 1/9/17 09:24 DC 1/9/17 09:26


30 ML


 


 Lidocaine HCl


  (Viscous


 Lidocaine 2% Soln)  20 ml  STK-MED ONCE


 .ROUTE  1/9/17 09:23


 1/9/17 09:24 DC 1/9/17 09:26


20 ML











ECG


Indication:  chest pain


Rate (beats per minute):  72


Rhythm:  normal sinus


Findings:  ST depression (Anterior), T-wave inversion (Inferior), no acute 

ischemic change, no ectopy


Comparison ECG Date:  4/15/15


Change:


EKG Change: When compared to EKG done on 4/15/15, ST depressions and T wave 

inversions are new.





ED Course


0754: Past medical records reviewed. The patient was evaluated in room B7. A 

complete history and physical examination was performed. I discussed the 

treatment plan with the patient and he verbalized complete understanding and 

agreement.  He is going to be evaluated for further treatment.





0830: Ordered Nitroglycerin 1 inch EXT.





0847: Ordered Sodium Chloride 500 ml @ 999 mls/hr IV.





0850: I discussed the patients case with Abby Cardenas.  He is going 

to evaluate the patient for further treatment.





Medical Decision


Differential diagnosis:


Etiologies such as cardiac ischemia, aortic dissection, pulmonary embolism, 

pneumonia, pneumothorax, musculoskeletal, infections, pericarditis, myocarditis

, esophageal rupture, gastrointestinal, as well as others were entertained.





This is an 86-year-old male who presents emergency department complaining of 

chest pain along with epigastric pain that has been ongoing for the past 3 

days.  The patient's pain was relieved by nitroglycerin by EMS.  He received 

aspirin in route.  The patient has very subtle EKG changes including T-wave 

inversions in the inferior leads as well as what appeared to be anterior lead 

depressions.  The patient was given nitro paste here in emergency department.  

He was also given a GI cocktail, Pepcid and Carafate because he kept 

complaining of belching.  I did discuss the case with the hospitalist service 

who agreed to admit the patient.  Patient was in agreement with the treatment 

plan.





Consults


Time Called:  0847


Consulting Physician:  Abby Abbasi


Returned Call:  0850


I discussed the patients case with Abby Cardenas.  He is going to 

evaluate the patient for further treatment.





Impression





 Primary Impression:  Unstable angina





Scribe Attestation


The scribe's documentation has been prepared under my direction and personally 

reviewed by me in its entirety. I confirm that the note above accurately 

reflects all work, treatment, procedures, and medical decision making performed 

by me.





Departure Information


Dispostion


Being Evaluated By Hospitalist





Referrals


Driss Espana D.O. (PCP)

## 2017-01-10 VITALS — SYSTOLIC BLOOD PRESSURE: 157 MMHG | DIASTOLIC BLOOD PRESSURE: 91 MMHG | OXYGEN SATURATION: 96 % | HEART RATE: 66 BPM

## 2017-01-10 VITALS — HEART RATE: 69 BPM | SYSTOLIC BLOOD PRESSURE: 152 MMHG | OXYGEN SATURATION: 95 % | DIASTOLIC BLOOD PRESSURE: 89 MMHG

## 2017-01-10 VITALS
DIASTOLIC BLOOD PRESSURE: 90 MMHG | SYSTOLIC BLOOD PRESSURE: 149 MMHG | HEART RATE: 67 BPM | TEMPERATURE: 97.34 F | OXYGEN SATURATION: 95 %

## 2017-01-10 VITALS
HEART RATE: 64 BPM | DIASTOLIC BLOOD PRESSURE: 82 MMHG | TEMPERATURE: 97.7 F | OXYGEN SATURATION: 95 % | SYSTOLIC BLOOD PRESSURE: 145 MMHG

## 2017-01-10 VITALS
SYSTOLIC BLOOD PRESSURE: 132 MMHG | TEMPERATURE: 97.52 F | HEART RATE: 56 BPM | DIASTOLIC BLOOD PRESSURE: 78 MMHG | OXYGEN SATURATION: 95 %

## 2017-01-10 VITALS
HEART RATE: 60 BPM | DIASTOLIC BLOOD PRESSURE: 82 MMHG | OXYGEN SATURATION: 98 % | SYSTOLIC BLOOD PRESSURE: 163 MMHG | TEMPERATURE: 97.52 F

## 2017-01-10 VITALS — OXYGEN SATURATION: 95 %

## 2017-01-10 LAB
ANION GAP SERPL CALC-SCNC: 9 MMOL/L (ref 3–11)
BUN SERPL-MCNC: 32 MG/DL (ref 7–18)
BUN/CREAT SERPL: 15.9 (ref 10–20)
CALCIUM SERPL-MCNC: 9.2 MG/DL (ref 8.5–10.1)
CHLORIDE SERPL-SCNC: 102 MMOL/L (ref 98–107)
CO2 SERPL-SCNC: 28 MMOL/L (ref 21–32)
CREAT CL PREDICTED SERPL C-G-VRATE: 32.2 ML/MIN
CREAT SERPL-MCNC: 2 MG/DL (ref 0.6–1.4)
EOSINOPHIL NFR BLD AUTO: 163 K/UL (ref 130–400)
GLUCOSE SERPL-MCNC: 82 MG/DL (ref 70–99)
HCT VFR BLD CALC: 50.6 % (ref 42–52)
MCH RBC QN AUTO: 32.7 PG (ref 25–34)
MCHC RBC AUTO-ENTMCNC: 35.8 G/DL (ref 32–36)
MCV RBC AUTO: 91.3 FL (ref 80–100)
PMV BLD AUTO: 9.8 FL (ref 7.4–10.4)
POTASSIUM SERPL-SCNC: 3.6 MMOL/L (ref 3.5–5.1)
RBC # BLD AUTO: 5.54 M/UL (ref 4.7–6.1)
SODIUM SERPL-SCNC: 139 MMOL/L (ref 136–145)
WBC # BLD AUTO: 7.16 K/UL (ref 4.8–10.8)

## 2017-01-10 RX ADMIN — HYDROCHLOROTHIAZIDE SCH MG: 25 TABLET ORAL at 08:29

## 2017-01-10 RX ADMIN — ALUMINUM ZIRCONIUM TRICHLOROHYDREX GLY SCH EA: 0.2 STICK TOPICAL at 16:00

## 2017-01-10 RX ADMIN — DIBASIC SODIUM PHOSPHATE, MONOBASIC POTASSIUM PHOSPHATE AND MONOBASIC SODIUM PHOSPHATE SCH TAB: 852; 155; 130 TABLET ORAL at 08:29

## 2017-01-10 RX ADMIN — HEPARIN SODIUM SCH UNIT: 10000 INJECTION, SOLUTION INTRAVENOUS; SUBCUTANEOUS at 05:42

## 2017-01-10 RX ADMIN — HEPARIN SODIUM SCH UNIT: 10000 INJECTION, SOLUTION INTRAVENOUS; SUBCUTANEOUS at 13:49

## 2017-01-10 RX ADMIN — SODIUM CHLORIDE SCH MLS/HR: 900 INJECTION, SOLUTION INTRAVENOUS at 23:54

## 2017-01-10 RX ADMIN — Medication SCH MG: at 08:29

## 2017-01-10 RX ADMIN — ALUMINUM ZIRCONIUM TRICHLOROHYDREX GLY SCH EA: 0.2 STICK TOPICAL at 00:00

## 2017-01-10 RX ADMIN — METOPROLOL TARTRATE SCH MG: 25 TABLET, FILM COATED ORAL at 21:46

## 2017-01-10 RX ADMIN — HEPARIN SODIUM SCH UNIT: 10000 INJECTION, SOLUTION INTRAVENOUS; SUBCUTANEOUS at 21:44

## 2017-01-10 RX ADMIN — ALUMINUM ZIRCONIUM TRICHLOROHYDREX GLY SCH EA: 0.2 STICK TOPICAL at 08:30

## 2017-01-10 RX ADMIN — METOPROLOL TARTRATE SCH MG: 25 TABLET, FILM COATED ORAL at 08:30

## 2017-01-10 RX ADMIN — AMLODIPINE BESYLATE SCH MG: 5 TABLET ORAL at 08:29

## 2017-01-10 RX ADMIN — LOSARTAN POTASSIUM SCH MG: 50 TABLET, FILM COATED ORAL at 08:29

## 2017-01-10 NOTE — PROGRESS NOTE
Internal Med Progress Note


Date of Service:


Jeffrey 10, 2017.


Provider Documentation:





SUBJECTIVE:





Patient is seen and examined at bedside. He denies any chest pain, SOB, 

palpitations, dizziness. Throat pain" burning like" much improved. Denies any 

other complaints.  








OBJECTIVE:





Vital Signs-as noted below





General: Well built and nourished, no distress


      HEENT: NC/AT, EOMI, PERRLA


      Neck: Supple, No JVD


      CVS; S1, S2, No murmur


      Resp: CTA, Normal breath sounds, No accessory muscle use


      Abd: Soft, Protuberant, non tender, BS present


     CNS: No focal deficits


     Ext: No pedal edema, cyanosis, clubbing


     Skin: Warm and intact 





Lab data as noted below.


ASSESSMENT & PLAN:


Patient is a 86 Yr old male with PMH of CAD s/p stent placement,  CKD III, HTN, 

Dyslipidemia and other problems presented with intermittent dull to burning 

sensation, retrosternal, radiating to bilateral upper extremities and throat 

since 3 days duration.States having indigestion 3 days ago which relieved 

temporarily with Prilosec.Reports associated exertional SOB, no aggravating/

relieving factors.Denies any fever, chills, diaphoresis, palpitations, cough, 

wheezing, abd pain, nausea, vomiting, dizziness. Also denies orthopnea, PND, 

leg swelling. Initial trop is negative and EKG showing new T wave inversions in 

the inferolateral leads. States getting NTG en route to hospital which relieved 

his chest pain. 





Atypical chest pain R/O ACS


          H/O CAD S/P stents


          Negative troponin X 2, EKG:showed T wave inversions in inferior lead


          Repeat EKG: Non specific ST changes, No significant change 


          Continue monitoring in Tele


          ECHO: no wall motion abnormality, EF:Normal LV function 


          Troponin X 2 negative


          Appreciate Cardiology help


          Continue ASA, BB and fenofibrate


          H/O intolerance to statins  


          Continue PPI


          Will await for further cardiology recommendations  


 


       


CKD III


         Cr levels; 2.0


         Cr levels stable


         Monitor renal function       





H/O CVA and Left carotid endarterectomy


         Stable


         Continue home meds





Hypertension:


         continue losartan, metoprolol, HCTZ, Norvasc 





Dyslipidemia:


         continue fenofibrate 


         H/O intolerance to statins





DVT Px


        Heparin SQ 





Code Status:


         DNR  





 Disposition: Plan to DC today if cleared by cardiology


Vital Signs:











  Date Time  Temp Pulse Resp B/P Pulse Ox O2 Delivery O2 Flow Rate FiO2


 


1/10/17 07:51      Room Air  


 


1/10/17 07:39 36.4 60 16 163/82 98 Room Air  


 


1/10/17 04:00 36.4 56 20 132/78 95 Room Air  


 


1/10/17 04:00      Room Air  


 


1/10/17 00:00      Room Air  


 


1/9/17 23:58 36.6 62 20 160/90 95 Room Air  


 


1/9/17 20:00      Room Air  


 


1/9/17 19:27 36.5 65 18 164/88 94 Room Air  


 


1/9/17 15:35 36.3 64 18 150/83 95 Room Air  


 


1/9/17 15:34      Room Air  


 


1/9/17 12:25      Room Air  


 


1/9/17 11:20 36.2 76 20 164/85 96 Room Air  


 


1/9/17 10:02  62      


 


1/9/17 10:00  73 16 148/88 94 Room Air  


 


1/9/17 09:30  79 18 175/98    








Lab Results:





Results Past 24 Hours








Test


  1/9/17


11:55 1/9/17


14:00 1/9/17


14:29 1/9/17


19:50 Range/Units


 


 


Prothrombin Time


  11.8


  


  


  


  9.0-12.0


SECONDS


 


Prothromb Time International


Ratio 1.1


  


  


  


  0.9-1.1  


 


 


Creatine Kinase MB Ratio     0-3.0  


 


Creatine Kinase MB   3.2 4.0 0.5-3.6  ng/ml


 


Troponin I   < 0.015 0.034 0-0.045  ng/ml














Test


  1/9/17


20:00 1/10/17


06:10 


  


  Range/Units


 


 


Creatine Kinase MB Ratio     0-3.0  


 


White Blood Count  7.16   4.8-10.8  K/uL


 


Red Blood Count  5.54   4.7-6.1  M/uL


 


Hemoglobin  18.1   14.0-18.0  g/dL


 


Hematocrit  50.6   42-52  %


 


Mean Corpuscular Volume  91.3     fL


 


Mean Corpuscular Hemoglobin  32.7   25-34  pg


 


Mean Corpuscular Hemoglobin


Concent 


  35.8


  


  


  32-36  g/dl


 


 


RDW Standard Deviation  46.2   36.4-46.3  fL


 


RDW Coefficient of Variation  13.7   11.5-14.5  %


 


Platelet Count  163   130-400  K/uL


 


Mean Platelet Volume  9.8   7.4-10.4  fL


 


Sodium Level  139   136-145  mmol/L


 


Potassium Level  3.6   3.5-5.1  mmol/L


 


Chloride Level  102     mmol/L


 


Carbon Dioxide Level  28   21-32  mmol/L


 


Anion Gap  9.0   3-11  mmol/L


 


Blood Urea Nitrogen  32   7-18  mg/dl


 


Creatinine


  


  2.00


  


  


  0.60-1.40


mg/dl


 


Est Creatinine Clear Calc


Drug Dose 


  32.2


  


  


   ml/min


 


 


Estimated GFR (


American) 


  34.0


  


  


   


 


 


Estimated GFR (Non-


American 


  29.3


  


  


   


 


 


BUN/Creatinine Ratio  15.9   10-20  


 


Random Glucose  82   70-99  mg/dl


 


Calcium Level  9.2   8.5-10.1  mg/dl

## 2017-01-10 NOTE — PROGRESS NOTE
DATE: 01/10/2017

 

FOLLOWUP VISIT

 

SUBJECTIVE:  The patient is an 86-year-old male with a history of a stent in

the right coronary artery in 2015 following a positive stress echocardiogram.

 The patient presented with upper chest and throat discomfort.  His cardiac

markers are negative and his EKG shows no acute changes.  However, his story

is very consistent with activity related throat discomfort.  I am concerned

that this is an anginal equivalent.  I am concerned that performing a stress

test may put the patient in danger and not provide the information that we

need.  I have recommended to the patient that we proceed with a repeat

cardiac catheterization.  I have explained the risks, benefits and intent of

the procedure to him and he is willing to proceed.  The patient does have

renal insufficiency that is stable, but he will have to be well hydrated for

the cardiac catheterization.  There is no air support today because of the

weather and therefore, we will delay the cardiac catheterization until

tomorrow morning when the weather is supposed to clear in case the patient

does require an additional stent.

 

OBJECTIVE:

VITAL SIGNS:  Blood pressure 160/90 and pulse is regular at 66.  He is

afebrile.

GENERAL:  He is alert and oriented.  He is comfortable sitting in a chair.

HEENT:  He is normocephalic.  Pupils are equal and reactive to light. 

Extraocular muscles are intact bilaterally.

NECK:  The neck veins are flat.  Carotids have good upstrokes bilaterally

without bruits.  Thyroid is nonpalpable.

RESPIRATORY:  Breath sounds equal bilaterally and clear to auscultation.

CARDIOVASCULAR:  Heart has a regular rhythm.  Normal S1 and S2.  No S3 or S4.

 No cardiac rubs or murmurs.

GASTROINTESTINAL:  Abdomen is soft and nontender without organomegaly.

EXTREMITIES:  Free of edema, digit clubbing, or cyanosis.

NEUROLOGIC:  Grossly intact.

SKIN:  Warm to touch.

LYMPH NODES:  Negative to palpation.

 

LABORATORY DATA:  Cardiac markers are negative.  Creatinine is 2.0. 

Potassium is 3.6.  Hemoglobin is 18 with a hematocrit of 50 and WBC count of

7.16.

 

IMPRESSION:

1.  Throat and upper chest discomfort with activity, which may be an anginal

equivalent.

2.  History of previous right coronary stent.

3.  Renal insufficiency.

4.  History of a previous stroke with left carotid endarterectomy.

5.  Intolerant to statin medications.

 

RECOMMENDATIONS:  The patient as outlined above has agreed to cardiac

catheterization.  Because of no air support and the likelihood of a possible

stent, we will delay this catheterization until tomorrow morning when the

weather clears.

## 2017-01-11 VITALS
OXYGEN SATURATION: 94 % | DIASTOLIC BLOOD PRESSURE: 89 MMHG | TEMPERATURE: 97.52 F | HEART RATE: 61 BPM | SYSTOLIC BLOOD PRESSURE: 150 MMHG

## 2017-01-11 VITALS
OXYGEN SATURATION: 93 % | DIASTOLIC BLOOD PRESSURE: 83 MMHG | HEART RATE: 65 BPM | SYSTOLIC BLOOD PRESSURE: 165 MMHG | TEMPERATURE: 97.16 F

## 2017-01-11 VITALS — HEART RATE: 67 BPM | DIASTOLIC BLOOD PRESSURE: 87 MMHG | SYSTOLIC BLOOD PRESSURE: 151 MMHG

## 2017-01-11 VITALS
DIASTOLIC BLOOD PRESSURE: 79 MMHG | OXYGEN SATURATION: 95 % | SYSTOLIC BLOOD PRESSURE: 150 MMHG | TEMPERATURE: 97.7 F | HEART RATE: 73 BPM

## 2017-01-11 VITALS — OXYGEN SATURATION: 92 % | DIASTOLIC BLOOD PRESSURE: 83 MMHG | HEART RATE: 66 BPM | SYSTOLIC BLOOD PRESSURE: 145 MMHG

## 2017-01-11 VITALS — SYSTOLIC BLOOD PRESSURE: 147 MMHG | DIASTOLIC BLOOD PRESSURE: 78 MMHG | HEART RATE: 61 BPM

## 2017-01-11 VITALS
OXYGEN SATURATION: 93 % | TEMPERATURE: 97.52 F | DIASTOLIC BLOOD PRESSURE: 77 MMHG | HEART RATE: 58 BPM | SYSTOLIC BLOOD PRESSURE: 137 MMHG

## 2017-01-11 VITALS
TEMPERATURE: 97.16 F | DIASTOLIC BLOOD PRESSURE: 83 MMHG | OXYGEN SATURATION: 93 % | HEART RATE: 65 BPM | SYSTOLIC BLOOD PRESSURE: 165 MMHG

## 2017-01-11 VITALS
SYSTOLIC BLOOD PRESSURE: 121 MMHG | HEART RATE: 63 BPM | OXYGEN SATURATION: 96 % | TEMPERATURE: 97.88 F | DIASTOLIC BLOOD PRESSURE: 58 MMHG

## 2017-01-11 VITALS — SYSTOLIC BLOOD PRESSURE: 163 MMHG | HEART RATE: 66 BPM | DIASTOLIC BLOOD PRESSURE: 89 MMHG

## 2017-01-11 VITALS
HEART RATE: 65 BPM | TEMPERATURE: 97.7 F | OXYGEN SATURATION: 96 % | SYSTOLIC BLOOD PRESSURE: 147 MMHG | DIASTOLIC BLOOD PRESSURE: 83 MMHG

## 2017-01-11 VITALS — HEART RATE: 72 BPM | SYSTOLIC BLOOD PRESSURE: 161 MMHG | OXYGEN SATURATION: 94 % | DIASTOLIC BLOOD PRESSURE: 87 MMHG

## 2017-01-11 VITALS — HEART RATE: 64 BPM | SYSTOLIC BLOOD PRESSURE: 155 MMHG | DIASTOLIC BLOOD PRESSURE: 81 MMHG | OXYGEN SATURATION: 98 %

## 2017-01-11 VITALS — HEART RATE: 61 BPM | DIASTOLIC BLOOD PRESSURE: 80 MMHG | SYSTOLIC BLOOD PRESSURE: 146 MMHG

## 2017-01-11 VITALS
HEART RATE: 65 BPM | DIASTOLIC BLOOD PRESSURE: 83 MMHG | TEMPERATURE: 97.16 F | OXYGEN SATURATION: 93 % | SYSTOLIC BLOOD PRESSURE: 165 MMHG

## 2017-01-11 VITALS — DIASTOLIC BLOOD PRESSURE: 84 MMHG | OXYGEN SATURATION: 96 % | HEART RATE: 59 BPM | SYSTOLIC BLOOD PRESSURE: 157 MMHG

## 2017-01-11 VITALS
SYSTOLIC BLOOD PRESSURE: 142 MMHG | TEMPERATURE: 97.34 F | OXYGEN SATURATION: 96 % | HEART RATE: 64 BPM | DIASTOLIC BLOOD PRESSURE: 75 MMHG

## 2017-01-11 VITALS — HEART RATE: 58 BPM | DIASTOLIC BLOOD PRESSURE: 81 MMHG | SYSTOLIC BLOOD PRESSURE: 146 MMHG

## 2017-01-11 VITALS — OXYGEN SATURATION: 96 %

## 2017-01-11 PROCEDURE — 027034Z DILATION OF CORONARY ARTERY, ONE ARTERY WITH DRUG-ELUTING INTRALUMINAL DEVICE, PERCUTANEOUS APPROACH: ICD-10-PCS | Performed by: INTERNAL MEDICINE

## 2017-01-11 PROCEDURE — B2111ZZ FLUOROSCOPY OF MULTIPLE CORONARY ARTERIES USING LOW OSMOLAR CONTRAST: ICD-10-PCS | Performed by: INTERNAL MEDICINE

## 2017-01-11 PROCEDURE — 4A023N7 MEASUREMENT OF CARDIAC SAMPLING AND PRESSURE, LEFT HEART, PERCUTANEOUS APPROACH: ICD-10-PCS | Performed by: INTERNAL MEDICINE

## 2017-01-11 RX ADMIN — METOPROLOL TARTRATE SCH MG: 25 TABLET, FILM COATED ORAL at 21:39

## 2017-01-11 RX ADMIN — METOPROLOL TARTRATE SCH MG: 25 TABLET, FILM COATED ORAL at 08:27

## 2017-01-11 RX ADMIN — ALUMINUM ZIRCONIUM TRICHLOROHYDREX GLY SCH EA: 0.2 STICK TOPICAL at 08:00

## 2017-01-11 RX ADMIN — Medication SCH MG: at 09:00

## 2017-01-11 RX ADMIN — ALUMINUM ZIRCONIUM TRICHLOROHYDREX GLY SCH EA: 0.2 STICK TOPICAL at 00:00

## 2017-01-11 RX ADMIN — PANTOPRAZOLE SCH MG: 40 TABLET, DELAYED RELEASE ORAL at 11:03

## 2017-01-11 RX ADMIN — LOSARTAN POTASSIUM SCH MG: 50 TABLET, FILM COATED ORAL at 08:26

## 2017-01-11 RX ADMIN — ALUMINUM ZIRCONIUM TRICHLOROHYDREX GLY SCH EA: 0.2 STICK TOPICAL at 15:24

## 2017-01-11 RX ADMIN — AMLODIPINE BESYLATE SCH MG: 5 TABLET ORAL at 08:28

## 2017-01-11 RX ADMIN — DIBASIC SODIUM PHOSPHATE, MONOBASIC POTASSIUM PHOSPHATE AND MONOBASIC SODIUM PHOSPHATE SCH TAB: 852; 155; 130 TABLET ORAL at 08:27

## 2017-01-11 RX ADMIN — SODIUM CHLORIDE SCH MLS/HR: 900 INJECTION, SOLUTION INTRAVENOUS at 09:30

## 2017-01-11 RX ADMIN — HYDROCHLOROTHIAZIDE SCH MG: 25 TABLET ORAL at 08:27

## 2017-01-11 NOTE — PROGRESS NOTE
Medicine Progress Note


Date & Time of Visit:


Jan 11, 2017 at 19:12.


Subjective


Patient seen and examined.


Feeling well after catheterization.


Denies chest pain or SOB.





Objective





Last 8 Hrs








  Date Time  Temp Pulse Resp B/P Pulse Ox O2 Delivery O2 Flow Rate FiO2


 


1/11/17 18:52  66  145/83 92 Room Air  


 


1/11/17 18:13  72 18 161/87 94 Room Air  


 


1/11/17 17:30  59  157/84 96 Room Air  


 


1/11/17 17:10  61 16 138/86 94 Room Air  


 


1/11/17 16:55  70 16 144/81 96 Nasal Cannula 4 


 


1/11/17 15:45  66  163/89    


 


1/11/17 15:23 36.5 65 19 147/83 96 Room Air  


 


1/11/17 15:15  64 16 155/81 98   


 


1/11/17 11:25 36.4 61 16 150/89 94 Room Air  


 


1/11/17 11:15      Room Air  








Physical Exam:


General-awake; alert; NAD


Eyes-EOMI; no scleral icterus


Neck-no stridor; trachea midline


Lungs-CTA bilaterally anteriorly


Heart-RRR; no m/r/g


Abdomen-soft; NTND; nBS 


Extremities-no deformity; no c/c/e 


Neuro-no gross focal deficits


Laboratory Results:





Last 24 Hours








Test


  1/11/17


16:37


 


Kaolin Activated Coagulation


Time 250 SECONDS 


 











Assessment & Plan





Chest pain 


          H/O CAD S/P stents


          ACS r/o negative


          ECHO: no wall motion abnormality, EF:Normal LV function    


          Cardiology consulted


          Continue ASA, BB and fenofibrate


          H/O intolerance to statins  


          S/p LHC with PCI to RCA


 


       


CKD III


         Cr levels; 2.0


         Cr levels stable


         Monitor renal function       





H/O CVA and Left carotid endarterectomy


         Stable


         Continue home meds





Hypertension:


         continue losartan, metoprolol, HCTZ, Norvasc 





Dyslipidemia:


         continue fenofibrate 


         H/O intolerance to statins





DVT Px


        Heparin SQ 





Code Status:


         DNR  





Anticipate possible discharge tomorrow.


Consultants:


Cardiology


Current Inpatient Medications:





 Current Inpatient Medications








 Medications


  (Trade)  Dose


 Ordered  Sig/Kenny


 Route  Start Time


 Stop Time Status Last Admin


Dose Admin


 


 Heparin Sodium


  (Porcine)


  (Heparin Sq 5000


 Unit/0.5ml)  5,000 unit  Q8


 SQ  1/9/17 22:00


 2/8/17 21:59 Future Hold 1/10/17 21:44


5,000 UNIT


 


 Acetaminophen


  (Tylenol Tab)  650 mg  Q4H  PRN


 PO  1/9/17 09:45


 2/8/17 09:44   


 


 


 Ondansetron HCl


  (Zofran Inj)  4 mg  Q6H  PRN


 IV  1/9/17 09:45


 2/8/17 09:44   


 


 


 Nitroglycerin


  (Nitrostat Tab)  0.4 mg  UD  PRN


 SL  1/9/17 09:45


 2/8/17 09:44   


 


 


 Amlodipine


 Besylate


  (Norvasc Tab)  5 mg  DAILY


 PO  1/10/17 09:00


 2/8/17 08:59  1/11/17 08:28


5 MG


 


 Aspirin


  (Ecotrin Tab)  162 mg  DAILY


 PO  1/10/17 09:00


 2/9/17 08:59  1/10/17 08:29


162 MG


 


 Hydrochlorothiazide


  (Hydrochlorothiazide


 Tab)  25 mg  DAILY


 PO  1/10/17 09:00


    1/11/17 08:27


25 MG


 


 Losartan Potassium


  (coZAAR TAB)  50 mg  DAILY


 PO  1/10/17 09:00


 2/8/17 08:59  1/11/17 08:26


50 MG


 


 Metoprolol


 Tartrate


  (Lopressor Tab)  25 mg  BID


 PO  1/9/17 21:00


 2/8/17 20:59  1/11/17 08:27


25 MG


 


 Potassium/


 Phosphorus/Sodium


  (Phospha 250


 Neutral


 155-852-130 Mg)  1 tab  DAILY


 PO  1/10/17 09:00


 2/9/17 08:59  1/11/17 08:27


1 TAB


 


 Miscellaneous


 Information


  (Order Awaiting


 Action)  1 ea  QS


 N/A  1/9/17 16:00


 2/8/17 15:59   


 


 


 Miscellaneous


  (Iv Fluids


 Completed)  1 ea  PRN  PRN


 N/A  1/9/17 10:15


 1/9/18 10:14   


 


 


 Pantoprazole


 Sodium 40 mg  40 mg  DAILY@1100


 PO  1/11/17 11:00


 2/10/17 10:59  1/11/17 11:03


40 MG


 


 Sodium Chloride


  (Nss 1000ml)  1,000 ml @ 


 125 mls/hr  Q8H


 IV  1/11/17 18:00


 1/11/17 23:59  1/11/17 18:07


125 MLS/HR


 


 Clopidogrel


 Bisulfate


  (plAVix TAB)  75 mg  QAM


 PO  1/12/17 09:00


 2/11/17 08:59   


 


 


 Clopidogrel


 Bisulfate


  (plAVix TAB)  600 mg  TODAY@1800


 PO  1/11/17 18:00


 1/11/17 23:59  1/11/17 18:06


600 MG

## 2017-01-11 NOTE — PROCEDURE NOTE
Post-Mod Sedation Assessment


General


Date of Moderate Sedation


Jan 11, 2017.


Vital Signs:





 Vital Signs Past 12 Hours








  Date Time  Temp Pulse Resp B/P Pulse Ox O2 Delivery O2 Flow Rate FiO2


 


1/11/17 17:30  59  157/84 96 Room Air  


 


1/11/17 17:10  61 16 138/86 94 Room Air  


 


1/11/17 16:55  70 16 144/81 96 Nasal Cannula 4 


 


1/11/17 15:45  66  163/89    


 


1/11/17 15:23 36.5 65 19 147/83 96 Room Air  


 


1/11/17 15:15  64 16 155/81 98   


 


1/11/17 11:25 36.4 61 16 150/89 94 Room Air  


 


1/11/17 11:15      Room Air  


 


1/11/17 09:33 36.2 65 18 165/83 93 Room Air  


 


1/11/17 08:18 36.2 65 18 165/83 93 Room Air  


 


1/11/17 07:51      Room Air  


 


1/11/17 07:20 36.2 65 18 165/83 93 Room Air  











Review - Discharge Criteria


Vital Signs Stable:  Yes


Alert/Oriented/Conversant:  Yes


Returned to Baseline Mental St:  Yes


Nausea Absent/Minimal:  Yes


Pain/Discomfort/Absent/Minimal:  Yes


Normal/Baseline Respirations:  Yes


Active Bleeding?:  No


Pt Received D/C Instructions:  N/A


Prescriptions Given:  None





Specific Proced. D/C Criteria


Distal Pulses Present (Cardiac:  Yes


Groin site assessed-Card Cath:  Yes


Voided Prior To Discharge:  N/A





Discharged Patients


Adult Escort/Transportation:  Yes

## 2017-01-11 NOTE — PROGRESS NOTE
DATE: 01/11/2017

 

FOLLOWUP VISIT

 

SUBJECTIVE:  The patient had an uneventful night.  We plan on performing a

cardiac catheterization this morning.  He has had no further chest or throat

discomfort.  We do note that his creatinine is 2.0 and it has been stable. 

The patient does follow with Dr. Pitts and he states that his kidney function

has been stable for several years.  His creatinine really has not changed

appreciably since his previous heart catheterization in April of last year. 

We will give him good hydration.  The patient understands that there is a

risk to giving contrast dye to him, but he is willing to proceed it.  I have

spoken to Dr. Pitts and he is in favor us performing a cardiac

catheterization, so we will proceed.

## 2017-01-11 NOTE — CARDIAC CATH REPORT
PROCEDURE:  Coronary angiography.

 

HISTORY:  The patient is an 86-year-old male with a history of a coronary

stent placed in the right coronary artery in April 2015.  The patient

presented with classic angina symptoms of throat discomfort with minimal

activity.

 

PROCEDURE SUMMARY:  The patient was brought to the cardiac catheterization

lab.  After informed consent, the patient was prepped and draped in the usual

manner for a right transfemoral approach.  The patient had been well hydrated

prior to the procedure due to his renal insufficiency.  Preformed 5-E[place]ian

diagnostic catheter was utilized for the coronary angiograms.  Following the

procedure, the patient underwent intracoronary stent placement in the right

coronary artery and then was returned to his room in stable condition.

 

CORONARY ANGIOGRAPHY:  Selective injections of the left coronary artery

revealed the left main trunk to be patent.  There is a 30% ostial stenosis at

the left circumflex artery with the remainder of the artery being widely

patent.  The LAD extends to the apex of the heart.  The LAD system has minor

luminal irregularities but is widely patent.  Selective injections of the

right coronary artery reveal the previous stent site to be patent; however,

distal to that stent there is a concentric 95% stenosis before the takeoff of

the PDA.

 

SUMMARY:  The patient has a high grade 95% stenosis in the distal right

coronary artery with remainder of his coronary anatomy being patent.

 

RECOMMENDATIONS:  Evaluation for coronary intervention on the right coronary

artery.

## 2017-01-11 NOTE — CARDIAC CATHETERIZATION
Procedure Note


Procedure Date


Jan 11, 2017.





Pre-Procedure Diagnosis


Angina





AUC Score


7





Post-Procedure Diagnosis


Severe CAD





Procedure(s) Performed


Coronary Angiography





Cardiologist


Dr. Mercado





Assistant(s)


None





Estimated Blood Loss


None





Summary of Findings


Subtotal RCA





Hemodynamics


Rest Ao:


168/83


Final Ao:


161/75


LV:


valve not crossed





Recommendations


PCI without planned CABG





Specimens


None





Radiation Exposure (mGy)


873





Contrast (mls)


67





Fluids (cc crystalloids)


40





Procedural Complication(s)


None





Disposition


Recovery Room / PACU





ACC Data


Cardiac Status


Clinical evaluation leading to the procedure


CAD Presntation:  Unstable angina


Anginal Classification:  CCS III


Heart Failure:  No


Cardiogenic Shock w/in 24Hrs:  No


Cardiac Arrest w/in 24Hrs:  No


Imaging studies past 6 months:  No


Stress studies past 6 months:  No


Standard Exercise Stress Test:  No


Stress Echocardiogram:  No


Stress Testing w/SPECT MPI:  No


Cardiac CTA:  No





Coronary Anatomy


Dominant:  Right


Left Main (% Stenosis):  Normal


LAD (% Stenosis):  Normal


Circumflex (% Stenosis):  Normal


RCA (% Stenosis):  Distal (95)





Diagnostic


Status:  Urgent





Closure Device


Percutaneous Entry Location:  Femoral


Recommendations:  PCI without planned CABG

## 2017-01-12 VITALS
TEMPERATURE: 98.06 F | DIASTOLIC BLOOD PRESSURE: 65 MMHG | SYSTOLIC BLOOD PRESSURE: 135 MMHG | HEART RATE: 61 BPM | OXYGEN SATURATION: 96 %

## 2017-01-12 VITALS
TEMPERATURE: 98.24 F | HEART RATE: 63 BPM | DIASTOLIC BLOOD PRESSURE: 79 MMHG | OXYGEN SATURATION: 96 % | SYSTOLIC BLOOD PRESSURE: 139 MMHG

## 2017-01-12 VITALS
DIASTOLIC BLOOD PRESSURE: 79 MMHG | SYSTOLIC BLOOD PRESSURE: 139 MMHG | TEMPERATURE: 98.24 F | OXYGEN SATURATION: 96 % | HEART RATE: 63 BPM

## 2017-01-12 VITALS — OXYGEN SATURATION: 96 %

## 2017-01-12 LAB
ANION GAP SERPL CALC-SCNC: 12 MMOL/L (ref 3–11)
BASOPHILS # BLD: 0.02 K/UL (ref 0–0.2)
BASOPHILS NFR BLD: 0.3 %
BUN SERPL-MCNC: 30 MG/DL (ref 7–18)
BUN/CREAT SERPL: 17.8 (ref 10–20)
CALCIUM SERPL-MCNC: 8.8 MG/DL (ref 8.5–10.1)
CHLORIDE SERPL-SCNC: 107 MMOL/L (ref 98–107)
CO2 SERPL-SCNC: 21 MMOL/L (ref 21–32)
COMPLETE: YES
CREAT CL PREDICTED SERPL C-G-VRATE: 37.6 ML/MIN
CREAT SERPL-MCNC: 1.7 MG/DL (ref 0.6–1.4)
EOSINOPHIL NFR BLD AUTO: 162 K/UL (ref 130–400)
GLUCOSE SERPL-MCNC: 79 MG/DL (ref 70–99)
HCT VFR BLD CALC: 46.2 % (ref 42–52)
IG%: 0.1 %
IMM GRANULOCYTES NFR BLD AUTO: 22.4 %
LYMPHOCYTES # BLD: 1.58 K/UL (ref 1.2–3.4)
MCH RBC QN AUTO: 32 PG (ref 25–34)
MCHC RBC AUTO-ENTMCNC: 35.9 G/DL (ref 32–36)
MCV RBC AUTO: 89.2 FL (ref 80–100)
MONOCYTES NFR BLD: 11.1 %
NEUTROPHILS # BLD AUTO: 3 %
NEUTROPHILS NFR BLD AUTO: 63.1 %
PMV BLD AUTO: 10.1 FL (ref 7.4–10.4)
POTASSIUM SERPL-SCNC: 3.5 MMOL/L (ref 3.5–5.1)
RBC # BLD AUTO: 5.18 M/UL (ref 4.7–6.1)
SODIUM SERPL-SCNC: 140 MMOL/L (ref 136–145)
WBC # BLD AUTO: 7.04 K/UL (ref 4.8–10.8)

## 2017-01-12 RX ADMIN — HYDROCHLOROTHIAZIDE SCH MG: 25 TABLET ORAL at 08:15

## 2017-01-12 RX ADMIN — AMLODIPINE BESYLATE SCH MG: 5 TABLET ORAL at 08:15

## 2017-01-12 RX ADMIN — ALUMINUM ZIRCONIUM TRICHLOROHYDREX GLY SCH EA: 0.2 STICK TOPICAL at 00:00

## 2017-01-12 RX ADMIN — PANTOPRAZOLE SCH MG: 40 TABLET, DELAYED RELEASE ORAL at 08:14

## 2017-01-12 RX ADMIN — ALUMINUM ZIRCONIUM TRICHLOROHYDREX GLY SCH EA: 0.2 STICK TOPICAL at 08:00

## 2017-01-12 RX ADMIN — Medication SCH MG: at 08:15

## 2017-01-12 RX ADMIN — LOSARTAN POTASSIUM SCH MG: 50 TABLET, FILM COATED ORAL at 08:14

## 2017-01-12 RX ADMIN — DIBASIC SODIUM PHOSPHATE, MONOBASIC POTASSIUM PHOSPHATE AND MONOBASIC SODIUM PHOSPHATE SCH TAB: 852; 155; 130 TABLET ORAL at 08:14

## 2017-01-12 RX ADMIN — METOPROLOL TARTRATE SCH MG: 25 TABLET, FILM COATED ORAL at 08:14

## 2017-01-12 NOTE — DISCHARGE INSTRUCTIONS
Discharge Instructions


Admission


Reason for Admission:  Chest Pain





Discharge


Discharge Diagnosis / Problem:  Chest pain





Discharge Goals


Goal(s):  Therapeutic intervention





Activity Recommendations


Activity Limitations:  resume your previous activity





.





Instructions / Follow-Up


Instructions / Follow-Up


Please keep your appointment with Family Medicine Dr. Espana on January 17th at 

10:30am.





You will be contacted with a follow up appointment with Cardiology Dr. Slade. 





Please take Plavix every day, in addition to your other medications. A 

prescription was sent to [facility] and Dr. Espana can take care of additional 

refills to your mail order pharmacy.





Current Hospital Diet


Patient's current hospital diet: AHA Diet (Heart Healthy)





Discharge Diet


Recommended Diet:  AHA Diet (Heart Healthy)





Pending Studies


Studies pending at discharge:  no





Medical Emergencies








.


Who to Call and When:





Medical Emergencies:  If at any time you feel your situation is an emergency, 

please call 911 immediately.





.





Non-Emergent Contact


Non-Emergency issues call your:  Primary Care Provider, Cardiologist





.


.





"Provider Documentation" section prepared by Lesly Solis.





VTE Core Measure


Inpt VTE Proph given/why not?:  Unfractionated heparin SQ

## 2017-01-12 NOTE — DISCHARGE SUMMARY
Discharge Summary


Admission Date:


Jeffrey 10, 2017 at 13:18


Discharge Date:  Jan 12, 2017


Discharge Disposition:  Home


Principal Diagnosis:


CAD s/p PCI with ROBERT to RCA


Procedures:


[facility]


PCI:


Antithrombotic therapy: Heparin to ACT >250


Procedure: 


BMW wire passed through distal RCA lesion into R-PDA


Lesion predilated with 2.5 x 12 Sprinter balloon


3.5 x 18 Xience ROBERT overlapped distally with prior ROBERT


Post-dilated with 3.5 NC balloon


Post procedure good angiographic result with BELEM 3 flow, well-expanded stent 

and no significant residual stenosis





Summary:


1. Severe single vessel coronary artery disease


2. Successful PCI of distal RCA with 1 ROBERT (Xience 3.5. x 18)





Recommendations:


Dual-antiplatelet therapy with ASA/Plavix


High-dose statin


Continue home antihypertensive regimen including beta-blocker/ACE


Cardiac Rehab





TTE


* The left ventricle is normal in size.


* There is mild concentric left ventricular hypertrophy.


* The left ventricular wall motion is normal.


* Ejection Fraction = 55-60%.


* Grade I diastolic dysfunction, (abnormal relaxation pattern).


Consultations:


Cardiology


Medication Reconciliation


New Medications:  


Clopidogrel Bisulfate (Clopidogrel) 75 Mg Tab


75 MG PO QAM for 30 Days, #30 TAB





 


Continued Medications:  


Acetaminophen (Tylenol) 500 Mg Tab


500 MG PO PRN, TAB





Amlodipine (Norvasc) 5 Mg Tab


5 MG PO DAILY, 0 Refills





Aspirin (Aspirin Ec) 81 Mg Tab


162 MG PO DAILY





Fenofibrate (Tricor) 67 Mg Cap


67 MG PO DAILY, 0 Refills





Hydrochlorothiazide (Hctz *) 25 Mg Tab


25 MG PO DAILY, 0 Refills





Losartan Potassium (Cozaar *) 50 Mg Tab


50 MG PO DAILY





Metoprolol Tartrate (Lopressor) 25 Mg Tab


25 MG PO BID, TAB





Pot Phosphate Monobasic W/ Sod (Phospha 250 Neutral) 1 Tab Tab


250 MG PO DAILY











Admission Information


HPI (per Admitting provider):


This is an 85 y/o male with PMHx of CKD stage 3, CAD s/p stent placement, HTN, 

Dyslipidemia and other problems as outlined below who presents to the ED c/o 

intermittent chest pain x 3 days. Pt reports that 3 days ago he had some 

indigestion after eating pizza and developed 6/10 central "burning" chest pain 

that radiated to his esophagus and bilat shoulders. He tried taking a Prilosec 

which seemed to help however over the next few days his sxs would return when 

he was exerting himself. He states he always has some SOB but it also seemed to 

be worse than usual over the last few days. Pt has a history of tobacco use and 

CAD with stent placed a few years ago. He feels that this pain is similar to 

the sxs he felt before his stent was placed. Pt denies history of reflux. He 

currently resides at [facility]. He did not take his 

medicine this morning. Pt denies fever/chills, diaphoresis, palpitations, 

wheezing, abd pain, N/V, bowel or bladder issues, LE edema ,calf pain, 

lightheadedness/dizziness. In the ED, vitals are stable. Initial trop is 

negative and EKG showing new T wave inversions in the inferior leads and 

nonspec ST changes in the lateral leads. Pt received nitro which alleviated 

some of his pain. He is currently c/o 3/10 chest pain. Pt will be admitted for 

further evaluation and treatment.


Physical Exam (per Admitting):  


   General Appearance:  WD/WN, no apparent distress, + pertinent finding (Pt is 

sitting up in bed in NAD)


   Head:  normocephalic, atraumatic


   Eyes:  normal inspection


   ENT:  hearing grossly normal


   Neck:  supple


   Respiratory/Chest:  chest non-tender, lungs clear, normal breath sounds, no 

respiratory distress


   Cardiovascular:  regular rate, rhythm, no murmur


   Abdomen/GI:  normal bowel sounds, non tender, soft


   Back:  normal inspection


   Extremities/Musculoskelatal:  normal inspection, no calf tenderness, + 

swelling (trace bilat LE edema)


   Neurologic/Psych:  alert, normal mood/affect, oriented x 3


   Skin:  normal color, warm/dry





Hospital Course


Patient was admitted with chest pain. ACS r/o was negative. TTE was without 

wall motion abnormality and EF showed normal LV function. Cardiology was 

consulted. LHC was performed and a ROBERT was placed in the RCA. Patient tolerated 

procedure well without complication. Patient was continued on aspirin, losartan

, metoprolol. Patient was started on clopidogrel. Patient reports a history of 

intolerance to statins. Patient was also continued on the remainder of his home 

medications. 


Patient deemed stable for discharge with Family Medicine and Cardiology follow 

up.





PE on discharge:


General- awake; alert; NAD


Eyes- EOMI; no scleral icterus


Neck- no stridor; trachea midline


Lungs- CTA bilaterally; no wheezes/crackles


Heart- RRR; no m/r/g


Abdomen- soft; NTND; nBS


Back- no gross abnormalities


Extremities- no c/c/e; no deformity


Neuro- no gross focal deficits


Skin- no appreciable rash or bruise


.


Total time spent on discharge = 


This includes examination of the patient, discharge planning, medication 

reconciliation, and communication with other providers.





Discharge Instructions


Discharge Instructions


Admission


Reason for Admission:  Chest Pain





Discharge


Discharge Diagnosis / Problem:  Chest pain





Discharge Goals


Goal(s):  Therapeutic intervention





Activity Recommendations


Activity Limitations:  resume your previous activity





.





Instructions / Follow-Up


Instructions / Follow-Up


Please keep your appointment with Family Medicine Dr. Espana on January 17th at 

10:30am.





You will be contacted with a follow up appointment with Cardiology Dr. Slade. 





Please take Plavix every day, in addition to your other medications. A 

prescription was sent to [facility] and Dr. Espana can take care of additional 

refills to your mail order pharmacy.





Current Hospital Diet


Patient's current hospital diet: AHA Diet (Heart Healthy)





Discharge Diet


Recommended Diet:  AHA Diet (Heart Healthy)





Pending Studies


Studies pending at discharge:  no





Medical Emergencies








.


Who to Call and When:





Medical Emergencies:  If at any time you feel your situation is an emergency, 

please call 911 immediately.





.





Non-Emergent Contact


Non-Emergency issues call your:  Primary Care Provider, Cardiologist





.


.





"Provider Documentation" section prepared by Lesly Solis.





VTE Core Measure


Inpt VTE Proph given/why not?:  Unfractionated heparin SQ





Additional Copies To


Yaya Slade, Driss Moss D.O.

## 2017-01-12 NOTE — CARDIAC CATHETERIZATION
Procedure Note


Procedure Date


Jan 11, 2017.





Pre-Procedure Diagnosis


CAD





AUC Score


7





Post-Procedure Diagnosis


Severe CAD, Successful PCI





Procedure(s) Performed


Drug Eluting Stent, Femoral Artery Angiography





Cardiologist


Dr. Uribe





Assistant(s)


Ac





Estimated Blood Loss


15





Medication(s)


Heparin





Summary of Findings


Indication: Accelerating angina





Access: 6Fr right femoral artery


Catheters: JR 4 Guide





Findings:


For full details of patients coronary angiography findings please see cath 

report dictated by Dr. Mercado from 1/11/2017





Briefly, found to have 95% distal RCA stenosis, distal to prior stent.





Due to accelerating angina despite medical management decision made to proceed 

with PCI with stenting





Arterial Closure: Mynx








PCI:


Antithrombotic therapy: Heparin to ACT >250


Procedure: 


BMW wire passed through distal RCA lesion into R-PDA


Lesion predilated with 2.5 x 12 Sprinter balloon


3.5 x 18 Xience ROBERT overlapped distally with prior ROBERT


Post-dilated with 3.5 NC balloon


Post procedure good angiographic result with BELEM 3 flow, well-expanded stent 

and no significant residual stenosis





Summary:


1. Severe single vessel coronary artery disease


 


2. Successful PCI of distal RCA with 1 ROBERT (Xience 3.5. x 18)





Recommendations:


Dual-antiplatelet therapy with ASA/Plavix


High-dose statin


Continue home antihypertensive regimen including beta-blocker/ACE


Cardiac Rehab





Hemodynamics


Rest Ao:


161/75, 114


Final Ao:


142/64 97


LV:


n/a





Recommendations


PCI without planned CABG





Specimens


None





Radiation Exposure (mGy)


802





Contrast (mls)


60 Opti





Drains


None





Anesthesia


Moderate





Procedural Complication(s)


None





Disposition


PCU





ACC Data


Cardiac Status


Clinical evaluation leading to the procedure


CAD Presntation:  Stable angina


Anginal Classification:  CCS III


Heart Failure:  No, NYHA Class: CCS I


Cardiogenic Shock w/in 24Hrs:  No


Cardiac Arrest w/in 24Hrs:  No


Imaging studies past 6 months:  No


Stress studies past 6 months:  No


Standard Exercise Stress Test:  No


Stress Echocardiogram:  No


Stress Testing w/SPECT MPI:  No


Cardiac CTA:  No





Coronary Anatomy


Dominant:  Right


RCA (% Stenosis):  Distal (95)





Diagnostic


Physician's Name:  Dariusz Uribe MD


Status:  Elective





Closure Device


Percutaneous Entry Location:  Femoral


Closure Device:  Mynx


Recommendations:  PCI without planned CABG





Lesion


Segment Name:  Distal RCA


Culprit Artery:  Yes


Stenosis Prior to Rx (%):  95


Chronic Total Occlusion:  No


IVUS:  No


FFR:  No


Pre-Procedure BELEM Flow:  3


Previously Treated Lesion:  No


Lesion Complexity:  Non-High/Non-C


Lesion Length (mm):  15


Thrombus Present:  No


Bifurcation Lesion:  No


Guidewire Across Lesion:  Yes


Guidewire:  


   Stenosis Post-Procedure (%):  0


   Post-Procedure BELEM Flow:  3


   Device(s) Deployed:  Yes


   Type of Device(s):


Xience ROBERT





Intraprocedure Events


Significant Dissection:  No


Perforation:  No

## 2017-01-12 NOTE — PROGRESS NOTE
DATE: 01/12/2017

 

FOLLOWUP VISIT

 

SUBJECTIVE:  The patient is an 86-year-old male patient who presented with

progressive angina.  He underwent a cardiac catheterization yesterday that

revealed a high grade stenosis of the right coronary artery.  He received a

drug-eluting stent.  He had an uneventful night.  His creatinine today is

1.7, which is actually an overall improvement from his previous studies.

 

OBJECTIVE:

VITAL SIGNS:  Blood pressure is 130/80.  Pulse is regular at 60, he is

afebrile.

HEENT:  He is normocephalic.  Pupils are equal and reactive to light. 

Extraocular muscles are intact bilaterally.

NECK:  The neck veins are flat.  Carotids have good upstrokes bilaterally

without bruits.  Thyroid is nonpalpable.

RESPIRATORY:  Breath sounds equal bilaterally and clear to auscultation.

CARDIOVASCULAR:  Heart has a regular rhythm.  Normal S1, S2.  No S3, S4.  No

cardiac rubs or murmurs.

GASTROINTESTINAL:  Abdomen is soft, nontender without organomegaly.

EXTREMITIES:  The cardiac catheterization site in the right groin is healing

appropriately without hematoma.

NEUROLOGIC:  Grossly intact.

SKIN:  Warm to touch.

LYMPH NODES:  Negative to palpation.

 

IMPRESSION:

1.  Stable coronary artery disease.

2.  Status post drug-eluting stent placement, right coronary artery.

 

RECOMMENDATIONS:  The patient may be discharged today to outpatient followup.

 Unfortunately, he is intolerant to statins and will not go home on a statin

medication.  He will go home on dual antiplatelet therapy.  We plan followup

in the next 2-3 weeks.

## 2017-10-16 ENCOUNTER — HOSPITAL ENCOUNTER (EMERGENCY)
Dept: HOSPITAL 45 - C.EDB | Age: 82
Discharge: HOME | End: 2017-10-16
Payer: COMMERCIAL

## 2017-10-16 VITALS — OXYGEN SATURATION: 93 % | DIASTOLIC BLOOD PRESSURE: 87 MMHG | HEART RATE: 70 BPM | SYSTOLIC BLOOD PRESSURE: 159 MMHG

## 2017-10-16 VITALS
WEIGHT: 225.31 LBS | BODY MASS INDEX: 30.52 KG/M2 | WEIGHT: 225.31 LBS | BODY MASS INDEX: 30.52 KG/M2 | HEIGHT: 72.01 IN | HEIGHT: 72.01 IN

## 2017-10-16 VITALS — TEMPERATURE: 97.34 F

## 2017-10-16 DIAGNOSIS — Z90.49: ICD-10-CM

## 2017-10-16 DIAGNOSIS — Z80.9: ICD-10-CM

## 2017-10-16 DIAGNOSIS — Z79.899: ICD-10-CM

## 2017-10-16 DIAGNOSIS — G89.29: ICD-10-CM

## 2017-10-16 DIAGNOSIS — M54.5: Primary | ICD-10-CM

## 2017-10-16 DIAGNOSIS — Z79.82: ICD-10-CM

## 2017-10-16 DIAGNOSIS — Z98.49: ICD-10-CM

## 2017-10-16 DIAGNOSIS — Z96.1: ICD-10-CM

## 2017-10-16 DIAGNOSIS — I25.10: ICD-10-CM

## 2017-10-16 DIAGNOSIS — Z87.891: ICD-10-CM

## 2017-10-16 DIAGNOSIS — E78.5: ICD-10-CM

## 2017-10-16 DIAGNOSIS — R39.11: ICD-10-CM

## 2017-10-16 DIAGNOSIS — Z86.73: ICD-10-CM

## 2017-10-16 DIAGNOSIS — N18.3: ICD-10-CM

## 2017-10-16 DIAGNOSIS — I12.9: ICD-10-CM

## 2017-10-16 LAB
ANION GAP SERPL CALC-SCNC: 10 MMOL/L (ref 3–11)
APPEARANCE UR: CLEAR
BASOPHILS # BLD: 0.04 K/UL (ref 0–0.2)
BASOPHILS NFR BLD: 0.6 %
BILIRUB UR-MCNC: (no result) MG/DL
BUN SERPL-MCNC: 17 MG/DL (ref 7–18)
BUN/CREAT SERPL: 12 (ref 10–20)
CALCIUM SERPL-MCNC: 9.3 MG/DL (ref 8.5–10.1)
CHLORIDE SERPL-SCNC: 106 MMOL/L (ref 98–107)
CO2 SERPL-SCNC: 26 MMOL/L (ref 21–32)
COLOR UR: (no result)
COMPLETE: YES
CREAT CL PREDICTED SERPL C-G-VRATE: 44.1 ML/MIN
CREAT SERPL-MCNC: 1.46 MG/DL (ref 0.6–1.4)
EOSINOPHIL NFR BLD AUTO: 182 K/UL (ref 130–400)
GLUCOSE SERPL-MCNC: 93 MG/DL (ref 70–99)
HCT VFR BLD CALC: 50.5 % (ref 42–52)
IG%: 0.3 %
IMM GRANULOCYTES NFR BLD AUTO: 18.9 %
INR PPP: 1 (ref 0.9–1.1)
LYMPHOCYTES # BLD: 1.36 K/UL (ref 1.2–3.4)
MANUAL MICROSCOPIC REQUIRED?: NO
MCH RBC QN AUTO: 32.6 PG (ref 25–34)
MCHC RBC AUTO-ENTMCNC: 35.2 G/DL (ref 32–36)
MCV RBC AUTO: 92.5 FL (ref 80–100)
MONOCYTES NFR BLD: 10.8 %
NEUTROPHILS # BLD AUTO: 1.3 %
NEUTROPHILS NFR BLD AUTO: 68.1 %
NITRITE UR QL STRIP: (no result)
PARTIAL THROMBOPLASTIN RATIO: 1.2
PH UR STRIP: 5 [PH] (ref 4.5–7.5)
PMV BLD AUTO: 9.7 FL (ref 7.4–10.4)
POTASSIUM SERPL-SCNC: 4.1 MMOL/L (ref 3.5–5.1)
PROTHROMBIN TIME: 11.2 SECONDS (ref 9–12)
RBC # BLD AUTO: 5.46 M/UL (ref 4.7–6.1)
REVIEW REQ?: NO
SODIUM SERPL-SCNC: 142 MMOL/L (ref 136–145)
SP GR UR STRIP: 1.02 (ref 1–1.03)
URINE BILL WITH OR WITHOUT MIC: (no result)
URINE EPITHELIAL CELL AUTO: (no result) /LPF (ref 0–5)
UROBILINOGEN UR-MCNC: (no result) MG/DL
WBC # BLD AUTO: 7.19 K/UL (ref 4.8–10.8)

## 2017-10-16 NOTE — EMERGENCY ROOM VISIT NOTE
History


Report prepared by Marthaibe:  Ritika Vidales


Under the Supervision of:  Dr. Nazario Burroughs M.D.


First contact with patient:  10:59


Chief Complaint:  BACK PAIN


Stated Complaint:  BACK PAIN





History of Present Illness


The patient is a 87 year old white male with a past medical history of CAD, CKD

, HTN who presents to the ED with a cc of worsening right back pain beginning 

two months ago. Positive difficulty urinating. Negative history of back injuries

, recent injury or heavy lifting, blood in urine. The patient notes his pain 

shoots to his legs but he denies any pain relieving or worsening factors.





   Source of History:  patient


   Onset:  two months ago


   Position:  back


   Timing:  worsening


   Associated Symptoms:  + urinary symptoms


Note:


Pt notes shooting leg pain.





Review of Systems


See HPI for pertinent positives and negatives.  A total of ten systems were 

reviewed and were otherwise negative.





Past Medical & Surgical


Medical Problems:


(1) CAD (coronary artery disease)


(2) CKD (chronic kidney disease) stage 3, GFR 30-59 ml/min


(3) CVA (cerebral vascular accident)


(4) Dyslipidemia


(5) HTN (hypertension)


Surgical Problems:


(1) H/O cataract removal with insertion of prosthetic lens


(2) H/O umbilical hernia repair


(3) Hx of appendectomy


(4) Hx of cholecystectomy








Family History





Cancer





Social History


Smoking Status:  Former Smoker


Alcohol Use:  none


Drug Use:  none


Marital Status:  single


Occupation Status:  retired





Current/Historical Medications


Scheduled


Amlodipine (Norvasc), 5 MG PO DAILY


Aspirin (Aspirin Ec), 81 MG PO DAILY


Cholecalciferol (Vitamin D), 1,000 UNITS PO DAILY


Clopidogrel Bisulfate (Clopidogrel), 75 MG PO QAM


Fenofibrate (Tricor), 67 MG PO DAILY


Hydrochlorothiazide (Hctz), 25 MG PO DAILY


Losartan Potassium (Cozaar), 50 MG PO DAILY


Metoprolol Tartrate (Lopressor), 25 MG PO BID


Pot Phosphate Monobasic W/ Sod (Phospha 250 Neutral), 250 MG PO DAILY


Tamsulosin Hcl (Flomax), 0.4 MG PO DAILY





Allergies


Coded Allergies:  


     Loratadine (Verified  Adverse Reaction, Intermediate, GI SYMPTOMS, 1/9/17)


     Simvastatin (Verified  Adverse Reaction, Mild, MUSCLE /JOINT PAINT PAIN, 1/ 9/17)





Physical Exam


Vital Signs











  Date Time  Temp Pulse Resp B/P (MAP) Pulse Ox O2 Delivery O2 Flow Rate FiO2


 


10/16/17 14:08  70 20 159/87 93   


 


10/16/17 12:24  62 20 147/80 96   


 


10/16/17 10:29 36.3 84 18 164/97 91 Room Air  











Physical Exam


GENERAL: Awake, alert, well-appearing, NAD


HENT: Normocephalic, atraumatic.


EYES: Normal conjunctiva. Sclera non-icteric.


NECK: Supple. No nuchal rigidity. FROM.


RESPIRATORY: CTAB, no rhonchi, wheezing, crackles


CARDIAC: RRR, no MRG


ABDOMEN: Soft, NTND, BS+


MSK: No chest wall TTP, no LE edema. Right paraspinal lumbar TTP without any 

signs of trauma.


NEURO: GCS 15, CN 2-12 intact, moves all 4s on command. Neurovascular intact 

distally, no saddle anesthesias.


SKIN: No rash or jaundice noted.





Medical Decision & Procedures


ER Provider


Diagnostic Interpretation:


Radiology results as stated below per my review and radiologist interpretation: 





LUMBAR SPINE 5 VIEWS





FINDINGS: 5 views of the lumbar spine are correlated with abdominal CT dated


12/21/2012. The skeletal structures are osteopenic. There is no radiographic


evidence of fracture or malalignment. Vertebral body height is maintained


throughout the lumbar spine. Minimal retrolisthesis is noted at L3-L4. There is


straightening of the lumbar lordosis. Large anterior and lateral marginal


osteophytes are seen throughout. The transverse and spinous processes appear


intact. Advanced facet arthropathy is seen in the mid to lower lumbar region.


There is no evidence of spondylolysis. Mild disc space narrowing is seen at all


lumbar levels. The visualized sacrum and bony pelvis appear intact. Sclerotic


change is noted in the sacroiliac joints. There is a nonobstructive abdominal


bowel gas pattern. Cholecystectomy clips are observed. Mild atherosclerotic


calcification is seen in the abdominal aorta.





IMPRESSION:





1. There is no acute bony abnormality identified involving the lumbosacral


spine.





2. Osteopenia and multilevel spondylosis as above.











Dictated:  10/16/2017 12:19 PM


Transcribed:  10/16/2017 1:02 PM


[email or web address]





Electronically signed by:  Stuart Godinez M.D.





Laboratory Results


10/16/17 11:40








Red Blood Count 5.46, Mean Corpuscular Volume 92.5, Mean Corpuscular Hemoglobin 

32.6, Mean Corpuscular Hemoglobin Concent 35.2, Mean Platelet Volume 9.7, 

Neutrophils (%) (Auto) 68.1, Lymphocytes (%) (Auto) 18.9, Monocytes (%) (Auto) 

10.8, Eosinophils (%) (Auto) 1.3, Basophils (%) (Auto) 0.6, Neutrophils # (Auto

) 4.90, Lymphocytes # (Auto) 1.36, Monocytes # (Auto) 0.78, Eosinophils # (Auto

) 0.09, Basophils # (Auto) 0.04





10/16/17 11:40

















Test


  10/16/17


10:50 10/16/17


11:40


 


Urine Color DK YELLOW  


 


Urine Appearance CLEAR (CLEAR)  


 


Urine pH 5.0 (4.5-7.5)  


 


Urine Specific Gravity


  1.020


(1.000-1.030) 


 


 


Urine Protein 3+ (NEG)  


 


Urine Glucose (UA) NEG (NEG)  


 


Urine Ketones NEG (NEG)  


 


Urine Occult Blood TRACE (NEG)  


 


Urine Nitrite NEG (NEG)  


 


Urine Bilirubin NEG (NEG)  


 


Urine Urobilinogen NEG (NEG)  


 


Urine Leukocyte Esterase SMALL (NEG)  


 


Urine WBC (Auto)


  10-30 /hpf


(0-5) 


 


 


Urine RBC (Auto) 0-4 /hpf (0-4)  


 


Urine Hyaline Casts (Auto) 1-5 /lpf (0-5)  


 


Urine Epithelial Cells (Auto)


  20-30 /lpf


(0-5) 


 


 


Urine Bacteria (Auto) NEG (NEG)  


 


White Blood Count


  


  7.19 K/uL


(4.8-10.8)


 


Red Blood Count


  


  5.46 M/uL


(4.7-6.1)


 


Hemoglobin


  


  17.8 g/dL


(14.0-18.0)


 


Hematocrit  50.5 % (42-52) 


 


Mean Corpuscular Volume


  


  92.5 fL


()


 


Mean Corpuscular Hemoglobin


  


  32.6 pg


(25-34)


 


Mean Corpuscular Hemoglobin


Concent 


  35.2 g/dl


(32-36)


 


Platelet Count


  


  182 K/uL


(130-400)


 


Mean Platelet Volume


  


  9.7 fL


(7.4-10.4)


 


Neutrophils (%) (Auto)  68.1 % 


 


Lymphocytes (%) (Auto)  18.9 % 


 


Monocytes (%) (Auto)  10.8 % 


 


Eosinophils (%) (Auto)  1.3 % 


 


Basophils (%) (Auto)  0.6 % 


 


Neutrophils # (Auto)


  


  4.90 K/uL


(1.4-6.5)


 


Lymphocytes # (Auto)


  


  1.36 K/uL


(1.2-3.4)


 


Monocytes # (Auto)


  


  0.78 K/uL


(0.11-0.59)


 


Eosinophils # (Auto)


  


  0.09 K/uL


(0-0.5)


 


Basophils # (Auto)


  


  0.04 K/uL


(0-0.2)


 


RDW Standard Deviation


  


  47.0 fL


(36.4-46.3)


 


RDW Coefficient of Variation


  


  13.9 %


(11.5-14.5)


 


Immature Granulocyte % (Auto)  0.3 % 


 


Immature Granulocyte # (Auto)


  


  0.02 K/uL


(0.00-0.02)


 


Prothrombin Time


  


  11.2 SECONDS


(9.0-12.0)


 


Prothromb Time International


Ratio 


  1.0 (0.9-1.1) 


 


 


Activated Partial


Thromboplast Time 


  31.9 SECONDS


(21.0-31.0)


 


Partial Thromboplastin Ratio  1.2 


 


Anion Gap


  


  10.0 mmol/L


(3-11)


 


Est Creatinine Clear Calc


Drug Dose 


  44.1 ml/min 


 


 


Estimated GFR (


American) 


  49.4 


 


 


Estimated GFR (Non-


American 


  42.6 


 


 


BUN/Creatinine Ratio  12.0 (10-20) 


 


Calcium Level


  


  9.3 mg/dl


(8.5-10.1)





Laboratory results reviewed by me





Medications Administered











 Medications


  (Trade)  Dose


 Ordered  Sig/Kenny


 Route  Start Time


 Stop Time Status Last Admin


Dose Admin


 


 Acetaminophen


  (Tylenol Tab)  650 mg  NOW  STAT


 PO  10/16/17 11:13


 10/16/17 11:15 DC 10/16/17 11:28


650 MG











ED Course


1117: The patient was evaluated in room C7. A complete history and physical 

exam was performed.





1322: I updated the patient with his bruna results.





1400: I reevaluated the patient. Discussed results and discharge instructions: 

He verbalized understanding and agreement. The patient is ready for discharge.





Medical Decision


The patient is a 87 year old white male with a past medical history of CAD, CKD

, HTN who presents to the ED with a cc of worsening right back pain beginning 

two months ago. Positive difficulty urinating. Negative history of back injuries

, recent injury or heavy lifting, blood in urine. 





Differential diagnosis:


Etiologies such as musculoskeletal, disc herniation, fracture, aortic disease, 

metastatic disease, cord compression, discitis, infection, renal colic, 

gastrointestinal, acute exacerbation of chronic back pain, sciatica, cauda 

equina, as well as others were entertained.





Patient was seen and evaluated the bedside.  Patient very well-appearing.  

Patient was complaining of some right-sided paraspinal tenderness.  He was also 

complaining of some urinary hesitancy.  Patient states he doesn't have any 

issues during the day but has worsening hesitancy in the evening.  Patient 

denies any trauma.  Patient does have some baseline CK D.  Patient did have 

blood work as well as a urine that were completed.  Patient's urine likely 

infected.  No blood seen patient did have L-spine films without any acute 

injury.  Patient was able to urinate without issue.  Patient has no saddle 

anesthesia numbness or tingling or weakness.  Less likely cauda equina.  

Patient does have a little bit of hesitancy for which she was given Flomax and 

given his age may have some BPH.  Patient had no other red flag signs 

consistent with lumbar spine such as unexplained weight loss, immunosuppressant 

therapy, chronic steroids, bowel or bladder incontinence, fever, IV drug abuse.

  Patient was told that he may continue to take some over-the-counter 

medications try some moist heat and/or ice.  Furthermore he try some stretching 

and/or physical therapy.  Patient should follow-up with his PCP to obtain 

physical therapy as well as possible follow-up with orthopedics.  Patient 

agreed with plan of care was safely discharged home.





Medication Reconcilliation


Current Medication List:  was personally reviewed by me





Blood Pressure Screening


Patient's blood pressure:  Elevated blood pressure


Blood pressure disposition:  Referred to PCP





Impression





 Primary Impression:  


 Urinary hesitancy


 Additional Impression:  


 Back pain





Scribe Attestation


The scribe's documentation has been prepared under my direction and personally 

reviewed by me in its entirety. I confirm that the note above accurately 

reflects all work, treatment, procedures, and medical decision making performed 

by me.





Departure Information


Dispostion


Home / Self-Care





Prescriptions





Tamsulosin Hcl (FLOMAX) 0.4 Mg Cap


0.4 MG PO DAILY for 30 Days, #30 CAP


   Prov: Nazario Burroughs M.D.         10/16/17





Referrals


Driss Espana D.O. (PCP)





Forms


HOME CARE DOCUMENTATION FORM,                                                 

               IMPORTANT VISIT INFORMATION





Patient Instructions


Benign Prostatic Hyperplasia, My [facility]





Additional Instructions





Please return to the emergency department if you have worsening or recurrent 

symptoms not amenable to at-home treatment.  Please call for a follow-up 

appointment with her primary care physician.  Please take your medications as 

prescribed.  If you have other concerns and/or complaints please feel free to 

also call your primary care physician's office or return the ED for further 

evaluation, management, and treatment.





If you have urinary retention or incontinence,  numbness, weakness, or tingling

, please return to the .





You may take tylenol 650 mg every 6 hours as needed for pain.





You have been examined and treated today on an emergency basis only. This is 

not a substitute for, or an effort to provide, complete comprehensive medical 

care. It is impossible to recognize and treat all injuries or illnesses in a 

single emergency department visit. It is therefore important that you follow up 

closely with [facility]. Call as soon as possible for an 

appointment.





Thank you for your time and consideration. I look forward to speaking with you 

again soon. Please don't hesitate to call us if you have any questions.





Problem Qualifiers








 Additional Impression:  


 Back pain


 Back pain location:  low back pain  Chronicity:  chronic  Back pain laterality

:  right  Sciatica presence:  without sciatica  Qualified Codes:  M54.5 - Low 

back pain; G89.29 - Other chronic pain

## 2017-10-16 NOTE — DIAGNOSTIC IMAGING REPORT
LUMBAR SPINE 5 VIEWS



CLINICAL HISTORY: Low back pain. No reported history of trauma.



FINDINGS: 5 views of the lumbar spine are correlated with abdominal CT dated

12/21/2012. The skeletal structures are osteopenic. There is no radiographic

evidence of fracture or malalignment. Vertebral body height is maintained

throughout the lumbar spine. Minimal retrolisthesis is noted at L3-L4. There is

straightening of the lumbar lordosis. Large anterior and lateral marginal

osteophytes are seen throughout. The transverse and spinous processes appear

intact. Advanced facet arthropathy is seen in the mid to lower lumbar region.

There is no evidence of spondylolysis. Mild disc space narrowing is seen at all

lumbar levels. The visualized sacrum and bony pelvis appear intact. Sclerotic

change is noted in the sacroiliac joints. There is a nonobstructive abdominal

bowel gas pattern. Cholecystectomy clips are observed. Mild atherosclerotic

calcification is seen in the abdominal aorta.



IMPRESSION:



1. There is no acute bony abnormality identified involving the lumbosacral

spine.



2. Osteopenia and multilevel spondylosis as above.







Dictated:  10/16/2017 12:19 PM

Transcribed:  10/16/2017 1:02 PM

[place]







Electronically signed by:  Stuart Godinez M.D.

10/16/2017 1:19 PM



Dictated Date/Time:  10/16/2017 12:19 PM

## 2017-10-20 ENCOUNTER — HOSPITAL ENCOUNTER (EMERGENCY)
Dept: HOSPITAL 45 - C.EDB | Age: 82
Discharge: HOME | End: 2017-10-20
Payer: COMMERCIAL

## 2017-10-20 VITALS
HEIGHT: 72.01 IN | BODY MASS INDEX: 30.82 KG/M2 | WEIGHT: 227.52 LBS | BODY MASS INDEX: 30.82 KG/M2 | HEIGHT: 72.01 IN | WEIGHT: 227.52 LBS

## 2017-10-20 VITALS — TEMPERATURE: 97.7 F

## 2017-10-20 VITALS — HEART RATE: 70 BPM | DIASTOLIC BLOOD PRESSURE: 90 MMHG | OXYGEN SATURATION: 97 % | SYSTOLIC BLOOD PRESSURE: 158 MMHG

## 2017-10-20 DIAGNOSIS — I12.9: ICD-10-CM

## 2017-10-20 DIAGNOSIS — M51.36: Primary | ICD-10-CM

## 2017-10-20 DIAGNOSIS — Z87.891: ICD-10-CM

## 2017-10-20 DIAGNOSIS — M54.9: ICD-10-CM

## 2017-10-20 DIAGNOSIS — N18.3: ICD-10-CM

## 2017-10-20 DIAGNOSIS — Z79.899: ICD-10-CM

## 2017-10-20 NOTE — EMERGENCY ROOM VISIT NOTE
History


Report prepared by Jonas:  Benjy Fonseca


Under the Supervision of:  Dr. Mirza Massey M.D.


First contact with patient:  09:37


Chief Complaint:  BACK PAIN


Stated Complaint:  BACK PAIN





History of Present Illness


The patient is a 87 year old male who presents to the Emergency Room with 

complaints of low right back pain beginning 2-3 months ago. His pain 

occasionally radiates into his left back, and down his left leg. He states that 

he feels a lot of pain in his left hip during these times as well. The patient 

also complains of urinary symptoms and bilateral leg weakness. His urinary 

symptoms include discomfort with urination, but no blood. He notes that is pain 

is typically present in the morning after waking up. The patient was seen in 

the ED four days ago with similar complaints. He states that his pain has 

worsened each day since his previous visit. He was told that his symptoms may 

be related to his prostate at his previous visit. The patient has emailed his 

PCP about his pain, but has not been evaluated by him. He states that his PCP 

referred him to the ED for imaging studies. He notes that he has not been 

sleeping well lately. Pt denies  trauma, LOC, headache, neck pain, fevers, 

chills, malaise, night sweats, weight loss, history of malignancy, chest pain, 

breathing difficulties, abdominal pain, saddle paraesthesias, bowel or bladder 

dysfunction, numbness, or other complaints. He has taken Tylenol for his pain, 

but nothing has improved his symptoms.





   Source of History:  patient


   Onset:  2-3 months ago


   Position:  back (right lower)


   Timing:  worsening


   Modifying Factors (Relieving):  other (none)


   Associated Symptoms:  + urinary symptoms (discomfort with urination), + 

weakness (bilateral leg)





Review of Systems


See HPI for pertinent positives and negatives.  A total of ten systems were 

reviewed and were otherwise negative.





Past Medical & Surgical


Medical Problems:


(1) CAD (coronary artery disease)


(2) CKD (chronic kidney disease) stage 3, GFR 30-59 ml/min


(3) CVA (cerebral vascular accident)


(4) Dyslipidemia


(5) HTN (hypertension)


Surgical Problems:


(1) H/O cataract removal with insertion of prosthetic lens


(2) H/O umbilical hernia repair


(3) Hx of appendectomy


(4) Hx of cholecystectomy








Family History





Cancer





Social History


Smoking Status:  Former Smoker


Alcohol Use:  none


Drug Use:  none


Marital Status:  single


Occupation Status:  retired





Current/Historical Medications


Scheduled


Allopurinol (Zyloprim), 100 MG PO DAILY


Amlodipine (Norvasc), 5 MG PO DAILY


Aspirin (Aspirin Ec), 81 MG PO DAILY


Cholecalciferol (Vitamin D), 1,000 UNITS PO DAILY


Clopidogrel Bisulfate (Clopidogrel), 75 MG PO QAM


Losartan Potassium (Cozaar), 50 MG PO DAILY


Metoprolol Tartrate (Lopressor), 25 MG PO BID


Pot Phosphate Monobasic W/ Sod (Phospha 250 Neutral), 250 MG PO DAILY





Scheduled PRN


Hydrocodone/Acetaminophen 5MG/325MG (Norco 5MG/325MG), 1-2 TABS PO Q6H PRN for 

Pain





Allergies


Coded Allergies:  


     Loratadine (Verified  Adverse Reaction, Intermediate, GI SYMPTOMS, 10/20/17

)


     Simvastatin (Verified  Adverse Reaction, Mild, MUSCLE /JOINT PAINT PAIN, 10

/20/17)





Physical Exam


Vital Signs











  Date Time  Temp Pulse Resp B/P (MAP) Pulse Ox O2 Delivery O2 Flow Rate FiO2


 


10/20/17 13:30  70 18 158/90 97 Room Air  


 


10/20/17 11:28  71 18 144/90  Room Air  


 


10/20/17 08:44    155/88    


 


10/20/17 08:37 36.5 72 20 191/113 96 Room Air  











Physical Exam


GENERAL: Awake, alert, uncomfortable-appearing, in no distress


HENT: Normocephalic, atraumatic. Oropharynx unremarkable.


EYES: Normal conjunctiva. Sclera non-icteric.


NECK: Supple. No nuchal rigidity. FROM. No JVD.


RESPIRATORY: Clear to auscultation.


CARDIAC: Regular rate, normal rhythm. Extremities warm and well perfused. 

Pulses equal.


ABDOMEN: Soft, non-distended. No tenderness to palpation. No rebound or 

guarding. No masses.


RECTAL: Deferred.


MUSCULOSKELETAL: Chest examination reveals no tenderness. The back is 

symmetrical on inspection without obvious abnormality. No lumbar tenderness to 

palpation. There is no CVA tenderness to palpation. No joint edema. 


LOWER EXTREMITIES: Calves are equal size bilaterally and non-tender. No edema. 

No discoloration. 


NEURO: Normal sensorium. No sensory or motor deficits noted. Negative SLR. No 

saddle anesthesia. 


SKIN: No rash or jaundice noted.





Medical Decision & Procedures


ER Provider


Diagnostic Interpretation:


Radiology results as stated below per my review and radiologist interpretation: 





MRI OF THE LUMBAR SPINE WITHOUT IV CONTRAST





FINDINGS:





Lumbar spine: Marrow signal intensity is heterogeneous. Vertebral body height


and alignment are maintained throughout the lumbar spine. There is straightening


of the lumbar lordosis with mild reversal centered at L3-L4. Large anterior


osteophytes are seen throughout. There are also large lateral marginal


osteophytes. The transverse and spinous processes appear intact. There is no


evidence of spondylolysis. No destructive bony lesion is seen. There is a


benign-appearing 1.7 cm cystic structure within the right pedicle of L5. This


has been present dating back to 2012 and is of doubtful significance. Chronic


degenerative endplate change is seen at all lumbar levels. Mild endplate edema


is present at L3-L4.





Intervertebral discs: There is degenerative disc desiccation and mild loss of


height seen throughout the lumbar spine.





Spinal cord: The visualized spinal cord is normal in morphology and signal


intensity. The conus medullaris terminates at the level of L2. The nerve roots


of the cauda equina are normal in morphology.





L1-L2: There is minimal disc bulge. The central canal and neural foramina are


widely patent.





L2-L3: There is minimal disc bulge with annular fissure. There is no significant


acquired compromise of the central canal. There is minimal bilateral


subarticular stenosis. The disc abuts the transiting bilateral nerve roots. The


neural foramina are patent.





L3-L4: There is minimal disc bulge. There is no significant acquired compromise


of the central canal and no neural foraminal stenosis is seen.





L4-L5: There is minimal disc bulge. There is no significant acquired compromise


of the central canal at this level. The minimum AP diameter measures 10 mm.


There is minimal bilateral subareolar particular stenosis. The neural foramina


are patent. Mild facet arthropathy is of no confluence.





L5-S1: The central canal and neural foramina are patent. Facet arthropathy is of


no consequence.





Sacrum: The visualized sacrum is normal in morphology and signal intensity. A


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





Soft tissues: There is mild fatty atrophy of the paraspinous musculature. There


is ectasia of the distal abdominal aorta. A large cyst is again seen arising


from the right kidney.








IMPRESSION:





1. There is no disc herniation or central canal stenosis.





2. Degenerative disc disease and endplate change as above. Endplate edema is


noted at L3-L4.





3. Spondylotic change as above. See discussion for level by level analysis.





4. No destructive bony process is identified.





Medications Administered











 Medications


  (Trade)  Dose


 Ordered  Sig/Kenny


 Route  Start Time


 Stop Time Status Last Admin


Dose Admin


 


 Acetaminophen/


 Hydrocodone Bitart


  (Norco 5/325 Tab)  1 tab  NOW  STAT


 PO  10/20/17 13:19


 10/20/17 13:20 DC 10/20/17 13:29


1 TAB











ED Course


0939: The patient was evaluated in room B6. A complete history and physical 

exam was performed.





1318: I reevaluated the patient. Discussed results and discharge instructions: 

he verbalized understanding and agreement. The patient is ready for discharge. 





1319: Ordered Norco 5/325 mg PO.





Medical Decision


Triage Nursing notes reviewed.


The patient's presentation and history were concerning for back pain and 

occasional urinary symptoms.





Etiologies such as lumbago, sciatica, cauda equina, epidural abscess, 

osteomyelitis, fracture, aortic disease, metastatic disease, infection, renal 

colic, gastrointestinal, as well as others were entertained.  





The patient was evaluated.  He has had ongoing back issues.  He was here a few 

days ago and his laboratory testing and x-rays were unremarkable.  He does note 

some   radicular symptoms.  Given the  


 persistent of pain I discussed further imaging.  He states that while he was 

sent here by his primary physician.  The patient underwent MR imaging and 

thankfully there was no evidence of discitis, fracture, osteomyelitis, or 

epidural issues.  The patient's urinalysis from the other day did not reveal 

any clear evidence of infection.  There was a new urine specimen obtained and 

sent for culture.  As the patient has no urinary symptoms at this moment I 

discussed treating him conservatively and waiting for the results on culture.  

He feels very comfortable with this.  He notes having a lot of difficulty 

sleeping because the pain.  He has tried Tylenol without relief.  I discussed a 

trial of pain medication and the patient was in agreement.  I did spend a 

significant amount time talking about the risks and benefits.  The patient 

indicated his understanding.  He was given the first dose of Norco in the 

Emergency Room and tolerated it well.  A small prescription was sent.  He 

states he has a pending appointment with his family doctor next week.  If he 

worsens in any way he will be back.  His culture results show any abnormalities 

he will be notified.  This seems like a musculoskeletal source as his pain 

worsens with movement.  He has no abdominal tenderness or GI symptoms.  I gave 

my usual and customary discussion regarding this issue.    





By the evaluation outlined above other emergent etiologies such as those listed 

in the differential, as well as others, were deemed relatively unlikely.  





The patient was educated about the findings as listed above.  All questions 

were answered and the patient was pleased with the treatment.  Return 

instructions were outlined and the patient was discharged in stable condition.  





The patient was referred to his PCP for follow-up for a recheck of the current 

condition.





Medication Reconcilliation


Current Medication List:  was personally reviewed by me





Blood Pressure Screening


Patient's blood pressure:  Elevated blood pressure


Blood pressure disposition:  Referred to PCP





Impression





 Primary Impression:  


 Back pain





Scribe Attestation


The scribe's documentation has been prepared under my direction and personally 

reviewed by me in its entirety. I confirm that the note above accurately 

reflects all work, treatment, procedures, and medical decision making performed 

by me.





Departure Information


Dispostion


Home / Self-Care





Prescriptions





Hydrocodone/Acetaminophen 5MG/325MG (Norco 5MG/325MG)  Tab


1-2 TABS PO Q6H Y for Pain, #12 TAB


   Prov: Mirza Massey MD         10/20/17





Referrals


Driss Espana D.O. (PCP)





Forms


HOME CARE DOCUMENTATION FORM,                                                 

               IMPORTANT VISIT INFORMATION





Patient Instructions


My [facility]





Additional Instructions





BACK PAIN/INJURY INSTRUCTIONS:





DO NOT drive, drink alcohol, operate machinery, or perform dangerous activities 

today.  You were given medications in the ER that can affect your ability to 

safely function or operate a vehicle.





Hydrocodone/acetaminophen 5/325mg: Take 1-2 pills every 6 hours as needed for 

pain.  Avoid additional Acetaminophen/Tylenol, alcohol, operating machinery or 

dangerous equipment, working on ladders or roofs, DRIVING, or situations where 

being under the influence may be dangerous.  It is recommended to use a stool 

softener such as Colace, 100mg twice daily while taking this medication to 

avoid constipation.





Rest and avoid heavy lifting until your symptoms resolve and then gradually 

return to full activity.  A good rule of thumb is if it hurts your back to 

perform a certain activity, then it should be avoided until you are healthy 

again.  





A heating pad, warm compresses, or a hot shower may help with tight muscles and 

can be done several times a day as needed.  





Continue current medications.





Return to the ER immediately for any numbness, tingling, severe pain, loss of 

control of your bowels or bladder, inability to walk, or as needed.





Follow up with your primary care physician within 3-5 days for a recheck of 

your current condition.

## 2017-10-20 NOTE — DIAGNOSTIC IMAGING REPORT
MRI OF THE LUMBAR SPINE WITHOUT IV CONTRAST



CLINICAL HISTORY: Low back pain. Urinary difficulty.



COMPARISON STUDY: Radiographs of the lumbar spine dated 10/16/2017. Abdominal CT

dated 12/21/2012.



TECHNIQUE: MRI of the lumbar spine is performed utilizing various T1 and

T2-weighted sequences in the axial and sagittal planes. IV contrast was not

administered for this examination.



FINDINGS:



Lumbar spine: Marrow signal intensity is heterogeneous. Vertebral body height

and alignment are maintained throughout the lumbar spine. There is straightening

of the lumbar lordosis with mild reversal centered at L3-L4. Large anterior

osteophytes are seen throughout. There are also large lateral marginal

osteophytes. The transverse and spinous processes appear intact. There is no

evidence of spondylolysis. No destructive bony lesion is seen. There is a

benign-appearing 1.7 cm cystic structure within the right pedicle of L5. This

has been present dating back to 2012 and is of doubtful significance. Chronic

degenerative endplate change is seen at all lumbar levels. Mild endplate edema

is present at L3-L4.



Intervertebral discs: There is degenerative disc desiccation and mild loss of

height seen throughout the lumbar spine.



Spinal cord: The visualized spinal cord is normal in morphology and signal

intensity. The conus medullaris terminates at the level of L2. The nerve roots

of the cauda equina are normal in morphology.



L1-L2: There is minimal disc bulge. The central canal and neural foramina are

widely patent.



L2-L3: There is minimal disc bulge with annular fissure. There is no significant

acquired compromise of the central canal. There is minimal bilateral

subarticular stenosis. The disc abuts the transiting bilateral nerve roots. The

neural foramina are patent.



L3-L4: There is minimal disc bulge. There is no significant acquired compromise

of the central canal and no neural foraminal stenosis is seen.



L4-L5: There is minimal disc bulge. There is no significant acquired compromise

of the central canal at this level. The minimum AP diameter measures 10 mm.

There is minimal bilateral subareolar particular stenosis. The neural foramina

are patent. Mild facet arthropathy is of no consequence.



L5-S1: The central canal and neural foramina are patent. Facet arthropathy is of

no consequence.



Sacrum: The visualized sacrum is normal in morphology and signal intensity. A

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



Soft tissues: There is mild fatty atrophy of the paraspinous musculature. There

is ectasia of the distal abdominal aorta. A large cyst is again seen arising

from the right kidney.





IMPRESSION:



1. There is no disc herniation or central canal stenosis.



2. Degenerative disc disease and endplate change as above. Endplate edema is

noted at L3-L4.



3. Spondylotic change as above. See discussion for level by level analysis.



4. No destructive bony process is identified.









Dictated:  10/20/2017 11:11 AM

Transcribed:  10/20/2017 11:48 AM

[facility]







Electronically signed by:  Stuart Godinez M.D.

10/20/2017 11:57 AM



Dictated Date/Time:  10/20/2017 11:11 AM

## 2017-12-11 ENCOUNTER — HOSPITAL ENCOUNTER (OUTPATIENT)
Dept: HOSPITAL 45 - C.LABSPEC | Age: 82
Discharge: HOME | End: 2017-12-11
Attending: UROLOGY
Payer: COMMERCIAL

## 2017-12-11 DIAGNOSIS — N40.1: Primary | ICD-10-CM

## 2018-01-29 ENCOUNTER — HOSPITAL ENCOUNTER (OUTPATIENT)
Dept: HOSPITAL 45 - C.LABVPSUA | Age: 83
Discharge: HOME | End: 2018-01-29
Attending: INTERNAL MEDICINE
Payer: MEDICARE

## 2018-01-29 DIAGNOSIS — I63.9: Primary | ICD-10-CM

## 2018-01-29 LAB
BUN SERPL-MCNC: 31 MG/DL (ref 7–18)
CREAT SERPL-MCNC: 1.15 MG/DL (ref 0.6–1.4)

## 2018-01-31 ENCOUNTER — HOSPITAL ENCOUNTER (OUTPATIENT)
Dept: HOSPITAL 45 - C.LABVPSUA | Age: 83
Discharge: HOME | End: 2018-01-31
Attending: INTERNAL MEDICINE
Payer: MEDICARE

## 2018-01-31 DIAGNOSIS — R31.9: Primary | ICD-10-CM

## 2018-02-05 ENCOUNTER — HOSPITAL ENCOUNTER (OUTPATIENT)
Dept: HOSPITAL 45 - C.LABVPSUA | Age: 83
Discharge: NURSING, REHAB OR LONG-TERM CARE | End: 2018-02-05
Attending: INTERNAL MEDICINE
Payer: MEDICARE

## 2018-02-05 DIAGNOSIS — E78.5: ICD-10-CM

## 2018-02-05 DIAGNOSIS — D64.9: ICD-10-CM

## 2018-02-05 DIAGNOSIS — I10: Primary | ICD-10-CM

## 2018-02-05 LAB
BASOPHILS # BLD: 0.04 K/UL (ref 0–0.2)
BASOPHILS NFR BLD: 0.5 %
BUN SERPL-MCNC: 47 MG/DL (ref 7–18)
CALCIUM SERPL-MCNC: 9.1 MG/DL (ref 8.5–10.1)
CO2 SERPL-SCNC: 27 MMOL/L (ref 21–32)
CREAT SERPL-MCNC: 1.54 MG/DL (ref 0.6–1.4)
EOS ABS #: 0.16 K/UL (ref 0–0.5)
EOSINOPHIL NFR BLD AUTO: 208 K/UL (ref 130–400)
GLUCOSE SERPL-MCNC: 96 MG/DL (ref 70–99)
HCT VFR BLD CALC: 45.3 % (ref 42–52)
HGB BLD-MCNC: 15.5 G/DL (ref 14–18)
IG#: 0.05 K/UL (ref 0–0.02)
IMM GRANULOCYTES NFR BLD AUTO: 20.6 %
KETONES UR QL STRIP: 75 MG/DL
LYMPHOCYTES # BLD: 1.7 K/UL (ref 1.2–3.4)
MCH RBC QN AUTO: 32.3 PG (ref 25–34)
MCHC RBC AUTO-ENTMCNC: 34.2 G/DL (ref 32–36)
MCV RBC AUTO: 94.4 FL (ref 80–100)
MONO ABS #: 0.95 K/UL (ref 0.11–0.59)
MONOCYTES NFR BLD: 11.5 %
NEUT ABS #: 5.37 K/UL (ref 1.4–6.5)
NEUTROPHILS # BLD AUTO: 1.9 %
NEUTROPHILS NFR BLD AUTO: 64.9 %
PH UR: 123 MG/DL (ref 0–200)
PMV BLD AUTO: 10.3 FL (ref 7.4–10.4)
POTASSIUM SERPL-SCNC: 3.5 MMOL/L (ref 3.5–5.1)
RED CELL DISTRIBUTION WIDTH CV: 14.1 % (ref 11.5–14.5)
RED CELL DISTRIBUTION WIDTH SD: 48.5 FL (ref 36.4–46.3)
SODIUM SERPL-SCNC: 144 MMOL/L (ref 136–145)
WBC # BLD AUTO: 8.27 K/UL (ref 4.8–10.8)

## 2018-02-13 NOTE — CODING QUERY NO DIAGNOSIS
: 1930



***Valid Physician Order Needed***



A valid physician order must be submitted in order to properly bill for the service(s) 
provided, including date of service(s), valid diagnosis, and physician signature. If these 
tests are done on a recurring basis the original physician order must be submitted in 
order to code and bill for the service(s) provided.



****Please fax us the original, signed physician order so that we may expedite billing to 
[phone]



DOS 18

* Partial Renal    DIAGNOSIS: I10   (Can't read code written in under the I10 diagnosis, 
251.89 is not a valid code)



* CBC w/ Diff    DIAGNOSIS:



* Lipid        DIAGNOSIS: E78.5









Physician Signature:_____________________________________



Thank you  

Felipa Null

Health Information Management

Phone:  [phone]

Fax:  [phone]

## 2018-03-13 ENCOUNTER — HOSPITAL ENCOUNTER (OUTPATIENT)
Dept: HOSPITAL 45 - C.LABVPSUA | Age: 83
Discharge: HOME | End: 2018-03-13
Attending: INTERNAL MEDICINE
Payer: MEDICARE

## 2018-03-13 DIAGNOSIS — N28.9: Primary | ICD-10-CM

## 2018-03-13 LAB
BUN SERPL-MCNC: 24 MG/DL (ref 7–18)
CALCIUM SERPL-MCNC: 8.9 MG/DL (ref 8.5–10.1)
CO2 SERPL-SCNC: 22 MMOL/L (ref 21–32)
CREAT SERPL-MCNC: 1.19 MG/DL (ref 0.6–1.4)
GLUCOSE SERPL-MCNC: 93 MG/DL (ref 70–99)
POTASSIUM SERPL-SCNC: 3.9 MMOL/L (ref 3.5–5.1)
SODIUM SERPL-SCNC: 139 MMOL/L (ref 136–145)

## 2018-03-20 ENCOUNTER — HOSPITAL ENCOUNTER (OUTPATIENT)
Dept: HOSPITAL 45 - C.LABVPSUA | Age: 83
Discharge: HOME | End: 2018-03-20
Attending: INTERNAL MEDICINE
Payer: MEDICARE

## 2018-03-20 DIAGNOSIS — K64.9: Primary | ICD-10-CM

## 2018-03-20 LAB
EOSINOPHIL NFR BLD AUTO: 223 K/UL (ref 130–400)
HCT VFR BLD CALC: 44.1 % (ref 42–52)
HGB BLD-MCNC: 14.9 G/DL (ref 14–18)
MCH RBC QN AUTO: 32.2 PG (ref 25–34)
MCHC RBC AUTO-ENTMCNC: 33.8 G/DL (ref 32–36)
MCV RBC AUTO: 95.2 FL (ref 80–100)
PMV BLD AUTO: 9.8 FL (ref 7.4–10.4)
RED CELL DISTRIBUTION WIDTH CV: 14.6 % (ref 11.5–14.5)
RED CELL DISTRIBUTION WIDTH SD: 50.9 FL (ref 36.4–46.3)
WBC # BLD AUTO: 8.12 K/UL (ref 4.8–10.8)

## 2018-04-10 ENCOUNTER — HOSPITAL ENCOUNTER (OUTPATIENT)
Dept: HOSPITAL 45 - C.LABVPSUA | Age: 83
Discharge: HOME | End: 2018-04-10
Attending: INTERNAL MEDICINE
Payer: MEDICARE

## 2018-04-10 DIAGNOSIS — N18.3: Primary | ICD-10-CM

## 2018-04-10 LAB
BUN SERPL-MCNC: 26 MG/DL (ref 7–18)
CALCIUM SERPL-MCNC: 8.8 MG/DL (ref 8.5–10.1)
CO2 SERPL-SCNC: 28 MMOL/L (ref 21–32)
CREAT SERPL-MCNC: 1.41 MG/DL (ref 0.6–1.4)
GLUCOSE SERPL-MCNC: 87 MG/DL (ref 70–99)
POTASSIUM SERPL-SCNC: 4.5 MMOL/L (ref 3.5–5.1)
SODIUM SERPL-SCNC: 138 MMOL/L (ref 136–145)